# Patient Record
Sex: FEMALE | Race: WHITE | NOT HISPANIC OR LATINO | URBAN - METROPOLITAN AREA
[De-identification: names, ages, dates, MRNs, and addresses within clinical notes are randomized per-mention and may not be internally consistent; named-entity substitution may affect disease eponyms.]

---

## 2017-07-12 ENCOUNTER — EMERGENCY (EMERGENCY)
Facility: HOSPITAL | Age: 27
LOS: 1 days | Discharge: HOME | End: 2017-07-12
Attending: EMERGENCY MEDICINE
Payer: MEDICAID

## 2017-07-12 VITALS
DIASTOLIC BLOOD PRESSURE: 112 MMHG | TEMPERATURE: 99 F | SYSTOLIC BLOOD PRESSURE: 143 MMHG | HEIGHT: 68 IN | WEIGHT: 220.02 LBS | RESPIRATION RATE: 18 BRPM | OXYGEN SATURATION: 98 % | HEART RATE: 109 BPM

## 2017-07-12 VITALS
DIASTOLIC BLOOD PRESSURE: 86 MMHG | SYSTOLIC BLOOD PRESSURE: 130 MMHG | TEMPERATURE: 98 F | RESPIRATION RATE: 18 BRPM | HEART RATE: 71 BPM | OXYGEN SATURATION: 100 %

## 2017-07-12 DIAGNOSIS — Z88.0 ALLERGY STATUS TO PENICILLIN: ICD-10-CM

## 2017-07-12 DIAGNOSIS — N10 ACUTE PYELONEPHRITIS: ICD-10-CM

## 2017-07-12 DIAGNOSIS — N20.0 CALCULUS OF KIDNEY: ICD-10-CM

## 2017-07-12 LAB
ALBUMIN SERPL ELPH-MCNC: 3.7 G/DL — SIGNIFICANT CHANGE UP (ref 3.5–5)
ALP SERPL-CCNC: 83 U/L — SIGNIFICANT CHANGE UP (ref 40–120)
ALT FLD-CCNC: 22 U/L DA — SIGNIFICANT CHANGE UP (ref 10–60)
ANION GAP SERPL CALC-SCNC: 8 MMOL/L — SIGNIFICANT CHANGE UP (ref 5–17)
APPEARANCE UR: ABNORMAL
AST SERPL-CCNC: 16 U/L — SIGNIFICANT CHANGE UP (ref 10–40)
BASOPHILS # BLD AUTO: 0.1 K/UL — SIGNIFICANT CHANGE UP (ref 0–0.2)
BASOPHILS NFR BLD AUTO: 1.5 % — SIGNIFICANT CHANGE UP (ref 0–2)
BILIRUB SERPL-MCNC: 0.4 MG/DL — SIGNIFICANT CHANGE UP (ref 0.2–1.2)
BILIRUB UR-MCNC: NEGATIVE — SIGNIFICANT CHANGE UP
BUN SERPL-MCNC: 15 MG/DL — SIGNIFICANT CHANGE UP (ref 7–18)
CALCIUM SERPL-MCNC: 8.7 MG/DL — SIGNIFICANT CHANGE UP (ref 8.4–10.5)
CHLORIDE SERPL-SCNC: 110 MMOL/L — HIGH (ref 96–108)
CO2 SERPL-SCNC: 22 MMOL/L — SIGNIFICANT CHANGE UP (ref 22–31)
COLOR SPEC: YELLOW — SIGNIFICANT CHANGE UP
CREAT SERPL-MCNC: 0.9 MG/DL — SIGNIFICANT CHANGE UP (ref 0.5–1.3)
DIFF PNL FLD: ABNORMAL
EOSINOPHIL # BLD AUTO: 0.2 K/UL — SIGNIFICANT CHANGE UP (ref 0–0.5)
EOSINOPHIL NFR BLD AUTO: 2.2 % — SIGNIFICANT CHANGE UP (ref 0–6)
GLUCOSE SERPL-MCNC: 82 MG/DL — SIGNIFICANT CHANGE UP (ref 70–99)
GLUCOSE UR QL: NEGATIVE — SIGNIFICANT CHANGE UP
HCG SERPL-ACNC: <1 MIU/ML — SIGNIFICANT CHANGE UP
HCT VFR BLD CALC: 39.4 % — SIGNIFICANT CHANGE UP (ref 34.5–45)
HGB BLD-MCNC: 13 G/DL — SIGNIFICANT CHANGE UP (ref 11.5–15.5)
KETONES UR-MCNC: NEGATIVE — SIGNIFICANT CHANGE UP
LEUKOCYTE ESTERASE UR-ACNC: ABNORMAL
LIDOCAIN IGE QN: 205 U/L — SIGNIFICANT CHANGE UP (ref 73–393)
LYMPHOCYTES # BLD AUTO: 2 K/UL — SIGNIFICANT CHANGE UP (ref 1–3.3)
LYMPHOCYTES # BLD AUTO: 23 % — SIGNIFICANT CHANGE UP (ref 13–44)
MAGNESIUM SERPL-MCNC: 2.1 MG/DL — SIGNIFICANT CHANGE UP (ref 1.6–2.6)
MCHC RBC-ENTMCNC: 33 GM/DL — SIGNIFICANT CHANGE UP (ref 32–36)
MCHC RBC-ENTMCNC: 33.2 PG — SIGNIFICANT CHANGE UP (ref 27–34)
MCV RBC AUTO: 100.5 FL — HIGH (ref 80–100)
MONOCYTES # BLD AUTO: 0.5 K/UL — SIGNIFICANT CHANGE UP (ref 0–0.9)
MONOCYTES NFR BLD AUTO: 5.5 % — SIGNIFICANT CHANGE UP (ref 2–14)
NEUTROPHILS # BLD AUTO: 5.8 K/UL — SIGNIFICANT CHANGE UP (ref 1.8–7.4)
NEUTROPHILS NFR BLD AUTO: 67.8 % — SIGNIFICANT CHANGE UP (ref 43–77)
NITRITE UR-MCNC: NEGATIVE — SIGNIFICANT CHANGE UP
PH UR: 6 — SIGNIFICANT CHANGE UP (ref 5–8)
PLATELET # BLD AUTO: 244 K/UL — SIGNIFICANT CHANGE UP (ref 150–400)
POTASSIUM SERPL-MCNC: 4.1 MMOL/L — SIGNIFICANT CHANGE UP (ref 3.5–5.3)
POTASSIUM SERPL-SCNC: 4.1 MMOL/L — SIGNIFICANT CHANGE UP (ref 3.5–5.3)
PROT SERPL-MCNC: 7.5 G/DL — SIGNIFICANT CHANGE UP (ref 6–8.3)
PROT UR-MCNC: 100
RBC # BLD: 3.92 M/UL — SIGNIFICANT CHANGE UP (ref 3.8–5.2)
RBC # FLD: 13.2 % — SIGNIFICANT CHANGE UP (ref 10.3–14.5)
SODIUM SERPL-SCNC: 140 MMOL/L — SIGNIFICANT CHANGE UP (ref 135–145)
SP GR SPEC: 1.01 — SIGNIFICANT CHANGE UP (ref 1.01–1.02)
UROBILINOGEN FLD QL: NEGATIVE — SIGNIFICANT CHANGE UP
WBC # BLD: 8.6 K/UL — SIGNIFICANT CHANGE UP (ref 3.8–10.5)
WBC # FLD AUTO: 8.6 K/UL — SIGNIFICANT CHANGE UP (ref 3.8–10.5)

## 2017-07-12 PROCEDURE — 99284 EMERGENCY DEPT VISIT MOD MDM: CPT | Mod: 25

## 2017-07-12 PROCEDURE — 96374 THER/PROPH/DIAG INJ IV PUSH: CPT

## 2017-07-12 PROCEDURE — 76830 TRANSVAGINAL US NON-OB: CPT | Mod: 26

## 2017-07-12 PROCEDURE — 81001 URINALYSIS AUTO W/SCOPE: CPT

## 2017-07-12 PROCEDURE — 85027 COMPLETE CBC AUTOMATED: CPT

## 2017-07-12 PROCEDURE — 83735 ASSAY OF MAGNESIUM: CPT

## 2017-07-12 PROCEDURE — 99285 EMERGENCY DEPT VISIT HI MDM: CPT

## 2017-07-12 PROCEDURE — 87086 URINE CULTURE/COLONY COUNT: CPT

## 2017-07-12 PROCEDURE — 74176 CT ABD & PELVIS W/O CONTRAST: CPT

## 2017-07-12 PROCEDURE — 76856 US EXAM PELVIC COMPLETE: CPT | Mod: 26

## 2017-07-12 PROCEDURE — 76830 TRANSVAGINAL US NON-OB: CPT

## 2017-07-12 PROCEDURE — 83690 ASSAY OF LIPASE: CPT

## 2017-07-12 PROCEDURE — 80053 COMPREHEN METABOLIC PANEL: CPT

## 2017-07-12 PROCEDURE — 84702 CHORIONIC GONADOTROPIN TEST: CPT

## 2017-07-12 PROCEDURE — 76856 US EXAM PELVIC COMPLETE: CPT

## 2017-07-12 PROCEDURE — 74176 CT ABD & PELVIS W/O CONTRAST: CPT | Mod: 26

## 2017-07-12 PROCEDURE — 96375 TX/PRO/DX INJ NEW DRUG ADDON: CPT

## 2017-07-12 RX ORDER — CEFTRIAXONE 500 MG/1
1 INJECTION, POWDER, FOR SOLUTION INTRAMUSCULAR; INTRAVENOUS ONCE
Qty: 0 | Refills: 0 | Status: COMPLETED | OUTPATIENT
Start: 2017-07-12 | End: 2017-07-12

## 2017-07-12 RX ORDER — CEFPODOXIME PROXETIL 100 MG
2 TABLET ORAL
Qty: 56 | Refills: 0 | OUTPATIENT
Start: 2017-07-12 | End: 2017-07-26

## 2017-07-12 RX ORDER — SODIUM CHLORIDE 9 MG/ML
1000 INJECTION INTRAMUSCULAR; INTRAVENOUS; SUBCUTANEOUS ONCE
Qty: 0 | Refills: 0 | Status: COMPLETED | OUTPATIENT
Start: 2017-07-12 | End: 2017-07-12

## 2017-07-12 RX ORDER — ONDANSETRON 8 MG/1
4 TABLET, FILM COATED ORAL ONCE
Qty: 0 | Refills: 0 | Status: COMPLETED | OUTPATIENT
Start: 2017-07-12 | End: 2017-07-12

## 2017-07-12 RX ORDER — MORPHINE SULFATE 50 MG/1
4 CAPSULE, EXTENDED RELEASE ORAL ONCE
Qty: 0 | Refills: 0 | Status: DISCONTINUED | OUTPATIENT
Start: 2017-07-12 | End: 2017-07-12

## 2017-07-12 RX ADMIN — ONDANSETRON 4 MILLIGRAM(S): 8 TABLET, FILM COATED ORAL at 11:30

## 2017-07-12 RX ADMIN — SODIUM CHLORIDE 1000 MILLILITER(S): 9 INJECTION INTRAMUSCULAR; INTRAVENOUS; SUBCUTANEOUS at 11:31

## 2017-07-12 RX ADMIN — CEFTRIAXONE 100 GRAM(S): 500 INJECTION, POWDER, FOR SOLUTION INTRAMUSCULAR; INTRAVENOUS at 11:35

## 2017-07-12 RX ADMIN — MORPHINE SULFATE 4 MILLIGRAM(S): 50 CAPSULE, EXTENDED RELEASE ORAL at 11:30

## 2017-07-12 RX ADMIN — MORPHINE SULFATE 4 MILLIGRAM(S): 50 CAPSULE, EXTENDED RELEASE ORAL at 11:36

## 2017-07-12 NOTE — ED PROVIDER NOTE - PHYSICAL EXAMINATION
Gen: Alert, NAD  Head: NC, AT   Eyes: PERRL, EOMI, normal lids/conjunctiva  ENT: normal hearing, patent oropharynx without erythema/exudate, uvula midline  Neck: supple, no tenderness, Trachea midline  Pulm: Bilateral BS, normal resp effort, no wheeze/stridor/retractions  CV: RRR, no M/R/G, 2+ radial and dp pulses bl, no edema  Abd: soft, minimal left lower abd ttp. +BS, no hepatosplenomegaly  Mskel: extremities x4 with normal ROM and no joint effusions. no ctl spine ttp.   Skin: no rash, no bruising   Neuro: AAOx3, no sensory/motor deficits, CN 2-12 intact Gen: Alert, NAD  Head: NC, AT   Eyes: PERRL, EOMI, normal lids/conjunctiva  ENT: normal hearing, patent oropharynx without erythema/exudate, uvula midline  Neck: supple, no tenderness, Trachea midline  Pulm: Bilateral BS, normal resp effort, no wheeze/stridor/retractions  CV: RRR, no M/R/G, 2+ radial and dp pulses bl, no edema  Abd: soft, minimal left lower abd ttp. +BS, no hepatosplenomegaly  Mskel: extremities x4 with normal ROM and no joint effusions. no ctl spine ttp.   Skin: no rash, no bruising   Neuro: AAOx3, no sensory/motor deficits, CN 2-12 intact  : no cervical tenderness, physiologic dc in vagina. normal external vulva

## 2017-07-12 NOTE — ED PROVIDER NOTE - OBJECTIVE STATEMENT
Pertinent PMH/PSH/FHx/SHx and Review of Systems contained within:  26F hx of previous kidney infections, completed abx 1.5 weeks ago, pw left sided lower abd pain x1 week. associated with subjective fevers, nausea but no vomiting. patient reports moving bowels normally, no vaginal dc, no dysuria. had ct scan several weeks ago, but does not know the results  Fh and Sh not otherwise contributory  ROS otherwise negative

## 2017-07-12 NOTE — ED PROVIDER NOTE - MEDICAL DECISION MAKING DETAILS
patient pw lower abd pain. evaluate for torsion vs uti vs sti patient pw lower abd pain. evaluate for torsion vs uti vs sti. Patient has UTI with pyelo and also with staghorn calculus. Will need abx but also urologic follow up. given she is not septic and tolerating po, she can be treated as outpatient.

## 2017-07-13 LAB
C TRACH RRNA SPEC QL NAA+PROBE: SIGNIFICANT CHANGE UP
CULTURE RESULTS: SIGNIFICANT CHANGE UP
N GONORRHOEA RRNA SPEC QL NAA+PROBE: SIGNIFICANT CHANGE UP
SPECIMEN SOURCE: SIGNIFICANT CHANGE UP
SPECIMEN SOURCE: SIGNIFICANT CHANGE UP

## 2017-07-27 ENCOUNTER — OUTPATIENT (OUTPATIENT)
Dept: OUTPATIENT SERVICES | Facility: HOSPITAL | Age: 27
LOS: 1 days | End: 2017-07-27
Payer: MEDICAID

## 2017-07-27 VITALS
RESPIRATION RATE: 19 BRPM | OXYGEN SATURATION: 97 % | HEART RATE: 93 BPM | WEIGHT: 225.09 LBS | TEMPERATURE: 98 F | DIASTOLIC BLOOD PRESSURE: 95 MMHG | SYSTOLIC BLOOD PRESSURE: 141 MMHG | HEIGHT: 68.5 IN

## 2017-07-27 DIAGNOSIS — N20.0 CALCULUS OF KIDNEY: ICD-10-CM

## 2017-07-27 DIAGNOSIS — Z98.890 OTHER SPECIFIED POSTPROCEDURAL STATES: Chronic | ICD-10-CM

## 2017-07-27 DIAGNOSIS — Z96.641 PRESENCE OF RIGHT ARTIFICIAL HIP JOINT: Chronic | ICD-10-CM

## 2017-07-27 DIAGNOSIS — Z01.818 ENCOUNTER FOR OTHER PREPROCEDURAL EXAMINATION: ICD-10-CM

## 2017-07-27 PROCEDURE — G0463: CPT

## 2017-07-27 RX ORDER — SODIUM CHLORIDE 9 MG/ML
3 INJECTION INTRAMUSCULAR; INTRAVENOUS; SUBCUTANEOUS EVERY 8 HOURS
Qty: 0 | Refills: 0 | Status: DISCONTINUED | OUTPATIENT
Start: 2017-08-01 | End: 2017-08-03

## 2017-07-27 NOTE — H&P PST ADULT - HISTORY OF PRESENT ILLNESS
26year old female with pmhx of hypertension, dislocated right hip, kidney stone, osteoarthritis and UTI presents today for presurgical testing for schedule for left percutaneous nephrolithotomy on 7/31/17

## 2017-07-27 NOTE — H&P PST ADULT - PMH
Dislocated hip  right hip  Hypertension    Kidney stones    Osteoarthritis    UTI (urinary tract infection)

## 2017-07-27 NOTE — H&P PST ADULT - PSH
History of hip replacement, total, right    History of pelvic surgery  Pelvic osteotomy - November 1999

## 2017-07-31 ENCOUNTER — RESULT REVIEW (OUTPATIENT)
Age: 27
End: 2017-07-31

## 2017-07-31 ENCOUNTER — INPATIENT (INPATIENT)
Facility: HOSPITAL | Age: 27
LOS: 2 days | Discharge: HOME | DRG: 659 | End: 2017-08-03
Attending: UROLOGY | Admitting: UROLOGY
Payer: MEDICAID

## 2017-07-31 VITALS
DIASTOLIC BLOOD PRESSURE: 87 MMHG | OXYGEN SATURATION: 98 % | RESPIRATION RATE: 19 BRPM | HEIGHT: 68.5 IN | SYSTOLIC BLOOD PRESSURE: 134 MMHG | TEMPERATURE: 98 F | HEART RATE: 84 BPM | WEIGHT: 225.09 LBS

## 2017-07-31 DIAGNOSIS — Z96.641 PRESENCE OF RIGHT ARTIFICIAL HIP JOINT: Chronic | ICD-10-CM

## 2017-07-31 DIAGNOSIS — N20.0 CALCULUS OF KIDNEY: ICD-10-CM

## 2017-07-31 DIAGNOSIS — Z98.890 OTHER SPECIFIED POSTPROCEDURAL STATES: Chronic | ICD-10-CM

## 2017-07-31 DIAGNOSIS — Z93.6 OTHER ARTIFICIAL OPENINGS OF URINARY TRACT STATUS: ICD-10-CM

## 2017-07-31 DIAGNOSIS — I10 ESSENTIAL (PRIMARY) HYPERTENSION: ICD-10-CM

## 2017-07-31 LAB
ABO RH CONFIRMATION: SIGNIFICANT CHANGE UP
ANION GAP SERPL CALC-SCNC: 10 MMOL/L — SIGNIFICANT CHANGE UP (ref 5–17)
BASOPHILS # BLD AUTO: 0.1 K/UL — SIGNIFICANT CHANGE UP (ref 0–0.2)
BASOPHILS NFR BLD AUTO: 0.5 % — SIGNIFICANT CHANGE UP (ref 0–2)
BUN SERPL-MCNC: 8 MG/DL — SIGNIFICANT CHANGE UP (ref 7–18)
CALCIUM SERPL-MCNC: 7.4 MG/DL — LOW (ref 8.4–10.5)
CHLORIDE SERPL-SCNC: 108 MMOL/L — SIGNIFICANT CHANGE UP (ref 96–108)
CO2 SERPL-SCNC: 21 MMOL/L — LOW (ref 22–31)
CREAT SERPL-MCNC: 1.13 MG/DL — SIGNIFICANT CHANGE UP (ref 0.5–1.3)
EOSINOPHIL # BLD AUTO: 0 K/UL — SIGNIFICANT CHANGE UP (ref 0–0.5)
EOSINOPHIL NFR BLD AUTO: 0 % — SIGNIFICANT CHANGE UP (ref 0–6)
GLUCOSE SERPL-MCNC: 153 MG/DL — HIGH (ref 70–99)
HCG UR QL: NEGATIVE — SIGNIFICANT CHANGE UP
HCT VFR BLD CALC: 31.3 % — LOW (ref 34.5–45)
HCT VFR BLD CALC: 34 % — LOW (ref 34.5–45)
HGB BLD-MCNC: 10.6 G/DL — LOW (ref 11.5–15.5)
HGB BLD-MCNC: 10.9 G/DL — LOW (ref 11.5–15.5)
LYMPHOCYTES # BLD AUTO: 0.8 K/UL — LOW (ref 1–3.3)
LYMPHOCYTES # BLD AUTO: 4.7 % — LOW (ref 13–44)
MCHC RBC-ENTMCNC: 32 GM/DL — SIGNIFICANT CHANGE UP (ref 32–36)
MCHC RBC-ENTMCNC: 32.8 PG — SIGNIFICANT CHANGE UP (ref 27–34)
MCHC RBC-ENTMCNC: 33.7 PG — SIGNIFICANT CHANGE UP (ref 27–34)
MCHC RBC-ENTMCNC: 34 GM/DL — SIGNIFICANT CHANGE UP (ref 32–36)
MCV RBC AUTO: 102.6 FL — HIGH (ref 80–100)
MCV RBC AUTO: 99.2 FL — SIGNIFICANT CHANGE UP (ref 80–100)
MONOCYTES # BLD AUTO: 0.6 K/UL — SIGNIFICANT CHANGE UP (ref 0–0.9)
MONOCYTES NFR BLD AUTO: 3.7 % — SIGNIFICANT CHANGE UP (ref 2–14)
NEUTROPHILS # BLD AUTO: 14.9 K/UL — HIGH (ref 1.8–7.4)
NEUTROPHILS NFR BLD AUTO: 91.1 % — HIGH (ref 43–77)
PLATELET # BLD AUTO: 262 K/UL — SIGNIFICANT CHANGE UP (ref 150–400)
PLATELET # BLD AUTO: 269 K/UL — SIGNIFICANT CHANGE UP (ref 150–400)
POTASSIUM SERPL-MCNC: 4 MMOL/L — SIGNIFICANT CHANGE UP (ref 3.5–5.3)
POTASSIUM SERPL-SCNC: 4 MMOL/L — SIGNIFICANT CHANGE UP (ref 3.5–5.3)
RBC # BLD: 3.16 M/UL — LOW (ref 3.8–5.2)
RBC # BLD: 3.32 M/UL — LOW (ref 3.8–5.2)
RBC # FLD: 13 % — SIGNIFICANT CHANGE UP (ref 10.3–14.5)
RBC # FLD: 13 % — SIGNIFICANT CHANGE UP (ref 10.3–14.5)
SODIUM SERPL-SCNC: 139 MMOL/L — SIGNIFICANT CHANGE UP (ref 135–145)
WBC # BLD: 16.4 K/UL — HIGH (ref 3.8–10.5)
WBC # BLD: 17.6 K/UL — HIGH (ref 3.8–10.5)
WBC # FLD AUTO: 16.4 K/UL — HIGH (ref 3.8–10.5)
WBC # FLD AUTO: 17.6 K/UL — HIGH (ref 3.8–10.5)

## 2017-07-31 PROCEDURE — 50433 PLMT NEPHROURETERAL CATHETER: CPT | Mod: LT

## 2017-07-31 PROCEDURE — 50081 PERQ NL/PL LITHOTRP CPLX>2CM: CPT | Mod: AS,LT

## 2017-07-31 PROCEDURE — 88300 SURGICAL PATH GROSS: CPT | Mod: 26

## 2017-07-31 RX ORDER — SODIUM CHLORIDE 9 MG/ML
1000 INJECTION, SOLUTION INTRAVENOUS
Qty: 0 | Refills: 0 | Status: DISCONTINUED | OUTPATIENT
Start: 2017-07-31 | End: 2017-08-01

## 2017-07-31 RX ORDER — MELOXICAM 15 MG/1
1 TABLET ORAL
Qty: 0 | Refills: 0 | COMMUNITY

## 2017-07-31 RX ORDER — ACETAMINOPHEN 500 MG
650 TABLET ORAL EVERY 6 HOURS
Qty: 0 | Refills: 0 | Status: DISCONTINUED | OUTPATIENT
Start: 2017-07-31 | End: 2017-08-03

## 2017-07-31 RX ORDER — CIPROFLOXACIN LACTATE 400MG/40ML
400 VIAL (ML) INTRAVENOUS EVERY 12 HOURS
Qty: 0 | Refills: 0 | Status: DISCONTINUED | OUTPATIENT
Start: 2017-07-31 | End: 2017-08-03

## 2017-07-31 RX ORDER — HYDRALAZINE HCL 50 MG
5 TABLET ORAL ONCE
Qty: 0 | Refills: 0 | Status: COMPLETED | OUTPATIENT
Start: 2017-07-31 | End: 2017-07-31

## 2017-07-31 RX ORDER — ACETAMINOPHEN 500 MG
1000 TABLET ORAL ONCE
Qty: 0 | Refills: 0 | Status: COMPLETED | OUTPATIENT
Start: 2017-07-31 | End: 2017-07-31

## 2017-07-31 RX ORDER — HYDROMORPHONE HYDROCHLORIDE 2 MG/ML
0.5 INJECTION INTRAMUSCULAR; INTRAVENOUS; SUBCUTANEOUS
Qty: 0 | Refills: 0 | Status: DISCONTINUED | OUTPATIENT
Start: 2017-07-31 | End: 2017-08-01

## 2017-07-31 RX ORDER — MORPHINE SULFATE 50 MG/1
4 CAPSULE, EXTENDED RELEASE ORAL EVERY 4 HOURS
Qty: 0 | Refills: 0 | Status: DISCONTINUED | OUTPATIENT
Start: 2017-07-31 | End: 2017-08-01

## 2017-07-31 RX ORDER — SODIUM CHLORIDE 9 MG/ML
1000 INJECTION INTRAMUSCULAR; INTRAVENOUS; SUBCUTANEOUS
Qty: 0 | Refills: 0 | Status: DISCONTINUED | OUTPATIENT
Start: 2017-07-31 | End: 2017-08-01

## 2017-07-31 RX ORDER — ACETAMINOPHEN 500 MG
2 TABLET ORAL
Qty: 0 | Refills: 0 | COMMUNITY

## 2017-07-31 RX ORDER — METOPROLOL TARTRATE 50 MG
2.5 TABLET ORAL ONCE
Qty: 0 | Refills: 0 | Status: COMPLETED | OUTPATIENT
Start: 2017-07-31 | End: 2017-07-31

## 2017-07-31 RX ORDER — OXYCODONE AND ACETAMINOPHEN 5; 325 MG/1; MG/1
1 TABLET ORAL EVERY 4 HOURS
Qty: 0 | Refills: 0 | Status: DISCONTINUED | OUTPATIENT
Start: 2017-07-31 | End: 2017-08-03

## 2017-07-31 RX ORDER — ONDANSETRON 8 MG/1
4 TABLET, FILM COATED ORAL EVERY 6 HOURS
Qty: 0 | Refills: 0 | Status: DISCONTINUED | OUTPATIENT
Start: 2017-07-31 | End: 2017-08-03

## 2017-07-31 RX ORDER — ONDANSETRON 8 MG/1
4 TABLET, FILM COATED ORAL ONCE
Qty: 0 | Refills: 0 | Status: DISCONTINUED | OUTPATIENT
Start: 2017-07-31 | End: 2017-08-01

## 2017-07-31 RX ADMIN — MORPHINE SULFATE 4 MILLIGRAM(S): 50 CAPSULE, EXTENDED RELEASE ORAL at 20:48

## 2017-07-31 RX ADMIN — SODIUM CHLORIDE 140 MILLILITER(S): 9 INJECTION, SOLUTION INTRAVENOUS at 17:00

## 2017-07-31 RX ADMIN — Medication 2.5 MILLIGRAM(S): at 18:16

## 2017-07-31 RX ADMIN — OXYCODONE AND ACETAMINOPHEN 1 TABLET(S): 5; 325 TABLET ORAL at 22:22

## 2017-07-31 RX ADMIN — ONDANSETRON 4 MILLIGRAM(S): 8 TABLET, FILM COATED ORAL at 20:47

## 2017-07-31 RX ADMIN — HYDROMORPHONE HYDROCHLORIDE 0.5 MILLIGRAM(S): 2 INJECTION INTRAMUSCULAR; INTRAVENOUS; SUBCUTANEOUS at 17:13

## 2017-07-31 RX ADMIN — HYDROMORPHONE HYDROCHLORIDE 0.5 MILLIGRAM(S): 2 INJECTION INTRAMUSCULAR; INTRAVENOUS; SUBCUTANEOUS at 17:33

## 2017-07-31 RX ADMIN — Medication 1000 MILLIGRAM(S): at 17:33

## 2017-07-31 RX ADMIN — Medication 400 MILLIGRAM(S): at 17:29

## 2017-07-31 RX ADMIN — MORPHINE SULFATE 4 MILLIGRAM(S): 50 CAPSULE, EXTENDED RELEASE ORAL at 20:55

## 2017-07-31 RX ADMIN — SODIUM CHLORIDE 65 MILLILITER(S): 9 INJECTION, SOLUTION INTRAVENOUS at 17:10

## 2017-07-31 RX ADMIN — HYDROMORPHONE HYDROCHLORIDE 0.5 MILLIGRAM(S): 2 INJECTION INTRAMUSCULAR; INTRAVENOUS; SUBCUTANEOUS at 17:34

## 2017-07-31 RX ADMIN — Medication 5 MILLIGRAM(S): at 18:50

## 2017-07-31 RX ADMIN — OXYCODONE AND ACETAMINOPHEN 1 TABLET(S): 5; 325 TABLET ORAL at 22:00

## 2017-07-31 RX ADMIN — Medication 200 MILLIGRAM(S): at 22:02

## 2017-07-31 RX ADMIN — HYDROMORPHONE HYDROCHLORIDE 0.5 MILLIGRAM(S): 2 INJECTION INTRAMUSCULAR; INTRAVENOUS; SUBCUTANEOUS at 17:29

## 2017-07-31 NOTE — CONSULT NOTE ADULT - PROBLEM SELECTOR RECOMMENDATION 9
Pt developed elevated BP of 165/116 after procedure with mild improvement after hydralazine dose.   Pt has known hx of HTN, previously taking atenolol 25mg daily  She was taken out of BP medications due to controlled BP a couple of months ago  Recommend restarting atenolol 25mg daily  Due to post op status can give lopressor 2.5mg IV x 1 and reassess BP  Monitor BP  recommend f/u TSH, lipid profile and HbA1C

## 2017-07-31 NOTE — CONSULT NOTE ADULT - ASSESSMENT
25 y/o F PMH of HTN and left kidney nephrolithiasis, who presents with scheduled for left percutaneous nephrolithotomy on 7/31/17. Pt developed elevated BP of 165/116 after procedure with mild improvement after hydralazine dose. Pt has known hx of HTN and was previously taking atenolol 25mg daily, she reported was taken out of BP medications due to controlled BP a couple of months ago

## 2017-07-31 NOTE — CONSULT NOTE ADULT - SUBJECTIVE AND OBJECTIVE BOX
Patient is a 25 y/o F PMH of HTN and left kidney nephrolithiasis, who presents with scheduled for left percutaneous nephrolithotomy on 7/31/17.  (31 Jul 2017 09:17).     INTERVAL HPI / OVERNIGHT EVENTS:  25 y/o F PMH of HTN (since age 13 y/o, unknown etiology) and left kidney nephrolithiasis, who presents with scheduled for left percutaneous nephrolithotomy on 7/31/17. Pt developed elevated BP of 165/116 after procedure with mild improvement after hydralazine dose. Pt has known hx of HTN and was previously taking atenolol 25mg daily, she reported was taken out of BP medications due to controlled BP a couple of months ago.    Family Hx: Father / Mother: HTN     Allergy: Penicillin (rash)     T(C): 37 (07-31-17 @ 16:20), Max: 37 (07-31-17 @ 16:20)  HR: 116 (07-31-17 @ 17:05) (84 - 116)  BP: 155/105 (07-31-17 @ 17:05) (112/100 - 165/116)  RR: 21 (07-31-17 @ 17:05) (18 - 21)  SpO2: 100% (07-31-17 @ 17:05) (98% - 100%)    LABS:                        10.9   17.6  )-----------( 269      ( 31 Jul 2017 16:45 )             34.0     REVIEW OF SYSTEMS:  CONSTITUTIONAL: No fever, weight loss, or fatigue  EYES: No eye pain, visual disturbances, or discharge  ENMT:  No difficulty hearing, tinnitus, vertigo; No sinus or throat pain  NECK: No pain or stiffness  BREASTS: No pain, masses, or nipple discharge  RESPIRATORY: No cough, wheezing, chills or hemoptysis; No shortness of breath  CARDIOVASCULAR: No chest pain, palpitations, dizziness, or leg swelling  GASTROINTESTINAL: No abdominal or epigastric pain. No nausea, vomiting, or hematemesis; No diarrhea or constipation. No melena or hematochezia.  GENITOURINARY: mild pain over nephrostomy site   NEUROLOGICAL: No headaches, memory loss, loss of strength, numbness, or tremors  SKIN: No itching, burning, rashes, or lesions   MUSCULOSKELETAL: No joint pain or swelling; No muscle, back, or extremity pain      RADIOLOGY & ADDITIONAL TESTS:    CT abdomen 7/12: Mild splenomegaly. No evidence for a calculus in the ureters or right kidney. Staghorn calculus in the left renal pelvis. No evidence for hydronephrosis.In the right adnexal region, there is a 5.0 x 2.9 cm cystic structure with a thin internal septations, suggestive of a right ovarian cyst.    Imaging Personally Reviewed:  [x] YES  [ ] NO    PHYSICAL EXAM:  GENERAL: NAD, well-groomed, well-developed  HEAD:  Atraumatic, Normocephalic  EYES: EOMI, PERRLA, conjunctiva and sclera clear  ENMT: No tonsillar erythema, exudates, or enlargement; Moist mucous membranes, Good dentition, No lesions  NECK: Supple, No JVD, Normal thyroid  NERVOUS SYSTEM:  Alert & Oriented X3, Good concentration; Motor Strength 5/5 B/L upper and lower extremities; DTRs 2+ intact and symmetric  CHEST/LUNG: Clear to percussion bilaterally; No rales, rhonchi, wheezing, or rubs  HEART: Regular rate and rhythm; No murmurs, rubs, or gallops  ABDOMEN: Soft, mild tenderness on palpation, Left nephrostomy tube with hematuria, Bowel sounds present  EXTREMITIES:  2+ Peripheral Pulses, No clubbing, cyanosis, or edema  SKIN: No rashes or lesions    Care Discussed with Consultants/Other Providers [x] YES  [ ] NO

## 2017-07-31 NOTE — CHART NOTE - NSCHARTNOTEFT_GEN_A_CORE
pt s/p pcnl   postop has been hypertensive  examined and noted to have blood soaked dressing  4 cc added to nephrostomy balloon with good effect  dressing checked 1 hour later relatively dry   hr has been has been in the 90's bp remains elevated  urine punch colored    plan   monitor in icu   bp control   serial hct next 8 pm  transfuse as needed  if continues to bleed will need IR embolization

## 2017-08-01 ENCOUNTER — TRANSCRIPTION ENCOUNTER (OUTPATIENT)
Age: 27
End: 2017-08-01

## 2017-08-01 DIAGNOSIS — N20.0 CALCULUS OF KIDNEY: ICD-10-CM

## 2017-08-01 DIAGNOSIS — Z29.9 ENCOUNTER FOR PROPHYLACTIC MEASURES, UNSPECIFIED: ICD-10-CM

## 2017-08-01 DIAGNOSIS — Z98.890 OTHER SPECIFIED POSTPROCEDURAL STATES: ICD-10-CM

## 2017-08-01 DIAGNOSIS — R58 HEMORRHAGE, NOT ELSEWHERE CLASSIFIED: ICD-10-CM

## 2017-08-01 DIAGNOSIS — I10 ESSENTIAL (PRIMARY) HYPERTENSION: ICD-10-CM

## 2017-08-01 DIAGNOSIS — N39.0 URINARY TRACT INFECTION, SITE NOT SPECIFIED: ICD-10-CM

## 2017-08-01 DIAGNOSIS — G89.18 OTHER ACUTE POSTPROCEDURAL PAIN: ICD-10-CM

## 2017-08-01 LAB
ANION GAP SERPL CALC-SCNC: 7 MMOL/L — SIGNIFICANT CHANGE UP (ref 5–17)
ANION GAP SERPL CALC-SCNC: 8 MMOL/L — SIGNIFICANT CHANGE UP (ref 5–17)
ANION GAP SERPL CALC-SCNC: 8 MMOL/L — SIGNIFICANT CHANGE UP (ref 5–17)
BASOPHILS # BLD AUTO: 0.1 K/UL — SIGNIFICANT CHANGE UP (ref 0–0.2)
BASOPHILS NFR BLD AUTO: 0.5 % — SIGNIFICANT CHANGE UP (ref 0–2)
BUN SERPL-MCNC: 8 MG/DL — SIGNIFICANT CHANGE UP (ref 7–18)
CALCIUM SERPL-MCNC: 7.4 MG/DL — LOW (ref 8.4–10.5)
CALCIUM SERPL-MCNC: 7.5 MG/DL — LOW (ref 8.4–10.5)
CALCIUM SERPL-MCNC: 7.6 MG/DL — LOW (ref 8.4–10.5)
CHLORIDE SERPL-SCNC: 104 MMOL/L — SIGNIFICANT CHANGE UP (ref 96–108)
CHLORIDE SERPL-SCNC: 104 MMOL/L — SIGNIFICANT CHANGE UP (ref 96–108)
CHLORIDE SERPL-SCNC: 107 MMOL/L — SIGNIFICANT CHANGE UP (ref 96–108)
CO2 SERPL-SCNC: 23 MMOL/L — SIGNIFICANT CHANGE UP (ref 22–31)
CREAT SERPL-MCNC: 0.96 MG/DL — SIGNIFICANT CHANGE UP (ref 0.5–1.3)
CREAT SERPL-MCNC: 0.97 MG/DL — SIGNIFICANT CHANGE UP (ref 0.5–1.3)
CREAT SERPL-MCNC: 0.99 MG/DL — SIGNIFICANT CHANGE UP (ref 0.5–1.3)
EOSINOPHIL # BLD AUTO: 0 K/UL — SIGNIFICANT CHANGE UP (ref 0–0.5)
EOSINOPHIL NFR BLD AUTO: 0 % — SIGNIFICANT CHANGE UP (ref 0–6)
GLUCOSE SERPL-MCNC: 134 MG/DL — HIGH (ref 70–99)
GLUCOSE SERPL-MCNC: 137 MG/DL — HIGH (ref 70–99)
GLUCOSE SERPL-MCNC: 139 MG/DL — HIGH (ref 70–99)
HCT VFR BLD CALC: 29.4 % — LOW (ref 34.5–45)
HCT VFR BLD CALC: 29.5 % — LOW (ref 34.5–45)
HCT VFR BLD CALC: 30.2 % — LOW (ref 34.5–45)
HGB BLD-MCNC: 10 G/DL — LOW (ref 11.5–15.5)
HGB BLD-MCNC: 10.1 G/DL — LOW (ref 11.5–15.5)
HGB BLD-MCNC: 9.9 G/DL — LOW (ref 11.5–15.5)
INR BLD: 1.04 RATIO — SIGNIFICANT CHANGE UP (ref 0.88–1.16)
LYMPHOCYTES # BLD AUTO: 0.9 K/UL — LOW (ref 1–3.3)
LYMPHOCYTES # BLD AUTO: 6.1 % — LOW (ref 13–44)
MAGNESIUM SERPL-MCNC: 1.5 MG/DL — LOW (ref 1.6–2.6)
MAGNESIUM SERPL-MCNC: 2.4 MG/DL — SIGNIFICANT CHANGE UP (ref 1.6–2.6)
MCHC RBC-ENTMCNC: 33.4 PG — SIGNIFICANT CHANGE UP (ref 27–34)
MCHC RBC-ENTMCNC: 33.4 PG — SIGNIFICANT CHANGE UP (ref 27–34)
MCHC RBC-ENTMCNC: 33.5 GM/DL — SIGNIFICANT CHANGE UP (ref 32–36)
MCHC RBC-ENTMCNC: 33.6 GM/DL — SIGNIFICANT CHANGE UP (ref 32–36)
MCHC RBC-ENTMCNC: 33.6 PG — SIGNIFICANT CHANGE UP (ref 27–34)
MCHC RBC-ENTMCNC: 33.9 GM/DL — SIGNIFICANT CHANGE UP (ref 32–36)
MCV RBC AUTO: 99 FL — SIGNIFICANT CHANGE UP (ref 80–100)
MCV RBC AUTO: 99.3 FL — SIGNIFICANT CHANGE UP (ref 80–100)
MCV RBC AUTO: 99.8 FL — SIGNIFICANT CHANGE UP (ref 80–100)
MONOCYTES # BLD AUTO: 0.7 K/UL — SIGNIFICANT CHANGE UP (ref 0–0.9)
MONOCYTES NFR BLD AUTO: 5.4 % — SIGNIFICANT CHANGE UP (ref 2–14)
NEUTROPHILS # BLD AUTO: 12.3 K/UL — HIGH (ref 1.8–7.4)
NEUTROPHILS NFR BLD AUTO: 88 % — HIGH (ref 43–77)
PHOSPHATE SERPL-MCNC: 2.3 MG/DL — LOW (ref 2.5–4.5)
PHOSPHATE SERPL-MCNC: 3.4 MG/DL — SIGNIFICANT CHANGE UP (ref 2.5–4.5)
PLATELET # BLD AUTO: 236 K/UL — SIGNIFICANT CHANGE UP (ref 150–400)
PLATELET # BLD AUTO: 237 K/UL — SIGNIFICANT CHANGE UP (ref 150–400)
PLATELET # BLD AUTO: 254 K/UL — SIGNIFICANT CHANGE UP (ref 150–400)
POTASSIUM SERPL-MCNC: 3.9 MMOL/L — SIGNIFICANT CHANGE UP (ref 3.5–5.3)
POTASSIUM SERPL-MCNC: 4.1 MMOL/L — SIGNIFICANT CHANGE UP (ref 3.5–5.3)
POTASSIUM SERPL-MCNC: 4.2 MMOL/L — SIGNIFICANT CHANGE UP (ref 3.5–5.3)
POTASSIUM SERPL-SCNC: 3.9 MMOL/L — SIGNIFICANT CHANGE UP (ref 3.5–5.3)
POTASSIUM SERPL-SCNC: 4.1 MMOL/L — SIGNIFICANT CHANGE UP (ref 3.5–5.3)
POTASSIUM SERPL-SCNC: 4.2 MMOL/L — SIGNIFICANT CHANGE UP (ref 3.5–5.3)
PROTHROM AB SERPL-ACNC: 11.4 SEC — SIGNIFICANT CHANGE UP (ref 9.8–12.7)
RBC # BLD: 2.96 M/UL — LOW (ref 3.8–5.2)
RBC # BLD: 2.98 M/UL — LOW (ref 3.8–5.2)
RBC # BLD: 3.03 M/UL — LOW (ref 3.8–5.2)
RBC # FLD: 12.7 % — SIGNIFICANT CHANGE UP (ref 10.3–14.5)
RBC # FLD: 12.7 % — SIGNIFICANT CHANGE UP (ref 10.3–14.5)
RBC # FLD: 12.9 % — SIGNIFICANT CHANGE UP (ref 10.3–14.5)
SODIUM SERPL-SCNC: 134 MMOL/L — LOW (ref 135–145)
SODIUM SERPL-SCNC: 135 MMOL/L — SIGNIFICANT CHANGE UP (ref 135–145)
SODIUM SERPL-SCNC: 138 MMOL/L — SIGNIFICANT CHANGE UP (ref 135–145)
WBC # BLD: 14 K/UL — HIGH (ref 3.8–10.5)
WBC # BLD: 14.1 K/UL — HIGH (ref 3.8–10.5)
WBC # BLD: 14.2 K/UL — HIGH (ref 3.8–10.5)
WBC # FLD AUTO: 14 K/UL — HIGH (ref 3.8–10.5)
WBC # FLD AUTO: 14.1 K/UL — HIGH (ref 3.8–10.5)
WBC # FLD AUTO: 14.2 K/UL — HIGH (ref 3.8–10.5)

## 2017-08-01 PROCEDURE — 99233 SBSQ HOSP IP/OBS HIGH 50: CPT

## 2017-08-01 RX ORDER — METOPROLOL TARTRATE 50 MG
5 TABLET ORAL ONCE
Qty: 0 | Refills: 0 | Status: COMPLETED | OUTPATIENT
Start: 2017-08-01 | End: 2017-08-01

## 2017-08-01 RX ORDER — METOCLOPRAMIDE HCL 10 MG
10 TABLET ORAL ONCE
Qty: 0 | Refills: 0 | Status: DISCONTINUED | OUTPATIENT
Start: 2017-08-01 | End: 2017-08-01

## 2017-08-01 RX ORDER — KETOROLAC TROMETHAMINE 30 MG/ML
30 SYRINGE (ML) INJECTION EVERY 6 HOURS
Qty: 0 | Refills: 0 | Status: DISCONTINUED | OUTPATIENT
Start: 2017-08-01 | End: 2017-08-02

## 2017-08-01 RX ORDER — SODIUM CHLORIDE 9 MG/ML
1000 INJECTION, SOLUTION INTRAVENOUS
Qty: 0 | Refills: 0 | Status: DISCONTINUED | OUTPATIENT
Start: 2017-08-01 | End: 2017-08-03

## 2017-08-01 RX ORDER — PANTOPRAZOLE SODIUM 20 MG/1
40 TABLET, DELAYED RELEASE ORAL DAILY
Qty: 0 | Refills: 0 | Status: DISCONTINUED | OUTPATIENT
Start: 2017-08-01 | End: 2017-08-03

## 2017-08-01 RX ORDER — MAGNESIUM SULFATE 500 MG/ML
2 VIAL (ML) INJECTION ONCE
Qty: 0 | Refills: 0 | Status: COMPLETED | OUTPATIENT
Start: 2017-08-01 | End: 2017-08-01

## 2017-08-01 RX ORDER — HYDROMORPHONE HYDROCHLORIDE 2 MG/ML
0.5 INJECTION INTRAMUSCULAR; INTRAVENOUS; SUBCUTANEOUS EVERY 4 HOURS
Qty: 0 | Refills: 0 | Status: DISCONTINUED | OUTPATIENT
Start: 2017-08-01 | End: 2017-08-02

## 2017-08-01 RX ORDER — METOPROLOL TARTRATE 50 MG
5 TABLET ORAL EVERY 6 HOURS
Qty: 0 | Refills: 0 | Status: DISCONTINUED | OUTPATIENT
Start: 2017-08-01 | End: 2017-08-02

## 2017-08-01 RX ORDER — POTASSIUM PHOSPHATE, MONOBASIC POTASSIUM PHOSPHATE, DIBASIC 236; 224 MG/ML; MG/ML
15 INJECTION, SOLUTION INTRAVENOUS ONCE
Qty: 0 | Refills: 0 | Status: COMPLETED | OUTPATIENT
Start: 2017-08-01 | End: 2017-08-01

## 2017-08-01 RX ORDER — METOPROLOL TARTRATE 50 MG
25 TABLET ORAL THREE TIMES A DAY
Qty: 0 | Refills: 0 | Status: DISCONTINUED | OUTPATIENT
Start: 2017-08-01 | End: 2017-08-01

## 2017-08-01 RX ADMIN — HYDROMORPHONE HYDROCHLORIDE 0.5 MILLIGRAM(S): 2 INJECTION INTRAMUSCULAR; INTRAVENOUS; SUBCUTANEOUS at 06:40

## 2017-08-01 RX ADMIN — SODIUM CHLORIDE 100 MILLILITER(S): 9 INJECTION, SOLUTION INTRAVENOUS at 11:59

## 2017-08-01 RX ADMIN — HYDROMORPHONE HYDROCHLORIDE 0.5 MILLIGRAM(S): 2 INJECTION INTRAMUSCULAR; INTRAVENOUS; SUBCUTANEOUS at 06:00

## 2017-08-01 RX ADMIN — Medication 5 MILLIGRAM(S): at 08:16

## 2017-08-01 RX ADMIN — Medication 30 MILLIGRAM(S): at 23:30

## 2017-08-01 RX ADMIN — HYDROMORPHONE HYDROCHLORIDE 0.5 MILLIGRAM(S): 2 INJECTION INTRAMUSCULAR; INTRAVENOUS; SUBCUTANEOUS at 08:44

## 2017-08-01 RX ADMIN — HYDROMORPHONE HYDROCHLORIDE 0.5 MILLIGRAM(S): 2 INJECTION INTRAMUSCULAR; INTRAVENOUS; SUBCUTANEOUS at 09:26

## 2017-08-01 RX ADMIN — OXYCODONE AND ACETAMINOPHEN 1 TABLET(S): 5; 325 TABLET ORAL at 11:53

## 2017-08-01 RX ADMIN — HYDROMORPHONE HYDROCHLORIDE 0.5 MILLIGRAM(S): 2 INJECTION INTRAMUSCULAR; INTRAVENOUS; SUBCUTANEOUS at 13:00

## 2017-08-01 RX ADMIN — OXYCODONE AND ACETAMINOPHEN 1 TABLET(S): 5; 325 TABLET ORAL at 07:23

## 2017-08-01 RX ADMIN — SODIUM CHLORIDE 3 MILLILITER(S): 9 INJECTION INTRAMUSCULAR; INTRAVENOUS; SUBCUTANEOUS at 22:02

## 2017-08-01 RX ADMIN — ONDANSETRON 4 MILLIGRAM(S): 8 TABLET, FILM COATED ORAL at 20:46

## 2017-08-01 RX ADMIN — HYDROMORPHONE HYDROCHLORIDE 0.5 MILLIGRAM(S): 2 INJECTION INTRAMUSCULAR; INTRAVENOUS; SUBCUTANEOUS at 13:30

## 2017-08-01 RX ADMIN — Medication 30 MILLIGRAM(S): at 18:11

## 2017-08-01 RX ADMIN — HYDROMORPHONE HYDROCHLORIDE 0.5 MILLIGRAM(S): 2 INJECTION INTRAMUSCULAR; INTRAVENOUS; SUBCUTANEOUS at 21:01

## 2017-08-01 RX ADMIN — OXYCODONE AND ACETAMINOPHEN 1 TABLET(S): 5; 325 TABLET ORAL at 07:50

## 2017-08-01 RX ADMIN — POTASSIUM PHOSPHATE, MONOBASIC POTASSIUM PHOSPHATE, DIBASIC 62.5 MILLIMOLE(S): 236; 224 INJECTION, SOLUTION INTRAVENOUS at 01:28

## 2017-08-01 RX ADMIN — SODIUM CHLORIDE 3 MILLILITER(S): 9 INJECTION INTRAMUSCULAR; INTRAVENOUS; SUBCUTANEOUS at 12:29

## 2017-08-01 RX ADMIN — HYDROMORPHONE HYDROCHLORIDE 0.5 MILLIGRAM(S): 2 INJECTION INTRAMUSCULAR; INTRAVENOUS; SUBCUTANEOUS at 00:10

## 2017-08-01 RX ADMIN — OXYCODONE AND ACETAMINOPHEN 1 TABLET(S): 5; 325 TABLET ORAL at 13:30

## 2017-08-01 RX ADMIN — SODIUM CHLORIDE 100 MILLILITER(S): 9 INJECTION, SOLUTION INTRAVENOUS at 00:30

## 2017-08-01 RX ADMIN — MORPHINE SULFATE 4 MILLIGRAM(S): 50 CAPSULE, EXTENDED RELEASE ORAL at 03:39

## 2017-08-01 RX ADMIN — ONDANSETRON 4 MILLIGRAM(S): 8 TABLET, FILM COATED ORAL at 03:39

## 2017-08-01 RX ADMIN — PANTOPRAZOLE SODIUM 40 MILLIGRAM(S): 20 TABLET, DELAYED RELEASE ORAL at 11:59

## 2017-08-01 RX ADMIN — OXYCODONE AND ACETAMINOPHEN 1 TABLET(S): 5; 325 TABLET ORAL at 15:00

## 2017-08-01 RX ADMIN — Medication 1 PATCH: at 08:30

## 2017-08-01 RX ADMIN — MORPHINE SULFATE 4 MILLIGRAM(S): 50 CAPSULE, EXTENDED RELEASE ORAL at 04:00

## 2017-08-01 RX ADMIN — Medication 25 GRAM(S): at 01:29

## 2017-08-01 RX ADMIN — HYDROMORPHONE HYDROCHLORIDE 0.5 MILLIGRAM(S): 2 INJECTION INTRAMUSCULAR; INTRAVENOUS; SUBCUTANEOUS at 20:46

## 2017-08-01 RX ADMIN — HYDROMORPHONE HYDROCHLORIDE 0.5 MILLIGRAM(S): 2 INJECTION INTRAMUSCULAR; INTRAVENOUS; SUBCUTANEOUS at 01:31

## 2017-08-01 RX ADMIN — Medication 30 MILLIGRAM(S): at 17:30

## 2017-08-01 RX ADMIN — Medication 200 MILLIGRAM(S): at 05:53

## 2017-08-01 RX ADMIN — Medication 30 MILLIGRAM(S): at 23:45

## 2017-08-01 RX ADMIN — Medication 200 MILLIGRAM(S): at 17:16

## 2017-08-01 RX ADMIN — OXYCODONE AND ACETAMINOPHEN 1 TABLET(S): 5; 325 TABLET ORAL at 09:30

## 2017-08-01 RX ADMIN — Medication 25 MILLIGRAM(S): at 07:24

## 2017-08-01 NOTE — CONSULT NOTE ADULT - SUBJECTIVE AND OBJECTIVE BOX
Patient is a 26y old  Female who presents with a chief complaint of 'Pre surgical testing - kidney surgery" (31 Jul 2017 09:17)                25 y/o F PMH of HTN (since age 15 y/o, unknown etiology) and left kidney nephrolithiasis, who presents for left percutaneous nephron lithotomy on 7/31/17. Patient was taken to OR and L sided lithotomy was performed. After the procedure, patient started to bleed from nephrostomy site and ICU was consulted for post op monitoring. because of bleeding and dressing soak from the site of the nephrostomy tube . s/p 4 cc added to nephrostomy balloon with good effect ( tamponade ) .h/h prior to procedure 10.9/34 and after episode of bleeding 10.6/31.3    patient is currenlty feeling well reports pain at the site of the neprostomy tube     PAST MEDICAL & SURGICAL HISTORY:  Hypertension  UTI (urinary tract infection)  Dislocated hip: right hip  Osteoarthritis  Kidney stones  History of hip replacement, total, right  History of pelvic surgery: Pelvic osteotomy - November 1999    Allergies    latex (Hives; Rash)  penicillin (Hives)    Intolerances      FAMILY HISTORY:  No pertinent family history in first degree relatives    Social history reviewed: single lives with child LMP 15 July 2017 current active smoker 1/2 PPD for 6 years     Review of Systems:  no fever , chills , SOB , complains of right flank pain       Medications:  lactated ringers. 1000 milliLiter(s) IV Continuous <Continuous>  sodium chloride 0.9%. 1000 milliLiter(s) IV Continuous <Continuous>  HYDROmorphone  Injectable 0.5 milliGRAM(s) IV Push every 10 minutes PRN  ondansetron Injectable 4 milliGRAM(s) IV Push once PRN  dextrose 5% + sodium chloride 0.45%. 1000 milliLiter(s) IV Continuous <Continuous>  acetaminophen   Tablet 650 milliGRAM(s) Oral every 6 hours PRN  oxyCODONE    5 mG/acetaminophen 325 mG 1 Tablet(s) Oral every 4 hours PRN  morphine  - Injectable 4 milliGRAM(s) IV Push every 4 hours PRN  ondansetron Injectable 4 milliGRAM(s) IV Push every 6 hours PRN  ciprofloxacin   IVPB 400 milliGRAM(s) IV Intermittent every 12 hours            Vital Signs Last 24 Hrs  T(C): 37.3 (31 Jul 2017 23:33), Max: 37.3 (31 Jul 2017 23:33)  T(F): 99.1 (31 Jul 2017 23:33), Max: 99.1 (31 Jul 2017 23:33)  HR: 113 (31 Jul 2017 23:30) (75 - 116)  BP: 152/105 (31 Jul 2017 23:30) (112/100 - 165/116)  BP(mean): 115 (31 Jul 2017 23:30) (11 - 117)  RR: 19 (31 Jul 2017 23:30) (17 - 23)  SpO2: 100% (31 Jul 2017 23:30) (98% - 100%)                LABS:                        10.6   16.4  )-----------( 262      ( 31 Jul 2017 20:34 )             31.3     07-31    139  |  108  |  8   ----------------------------<  153<H>  4.0   |  21<L>  |  1.13    Ca    7.4<L>      31 Jul 2017 22:14            CAPILLARY BLOOD GLUCOSE            CULTURES:        Physical Examination:    General: No acute distress.      HEENT: Pupils equal, reactive to light.  Symmetric.    PULM: Clear to auscultation bilaterally,     CVS: Regular rate and rhythm, no murmurs, rubs, or gallops    ABD: tender left flank and LLQ bs postive nephrostomy tube in place dressing mildly soaked with blood connected to greenwood bag and containing blood tinged urine.    EXT: No edema, nontender    SKIN: Warm and well perfused, no rashes noted.    NEURO: Alert, oriented, interactive, nonfocal    RADIOLOGY REVIEWED ***    CRITICAL CARE TIME SPENT: 35 minutes

## 2017-08-01 NOTE — CONSULT NOTE ADULT - PROBLEM SELECTOR RECOMMENDATION 3
htn: bp was elevated post procedure  likely due to pain  patient was on atenolol at home  monitor bp c/w pain management  will start on atenolol 25 mg po

## 2017-08-01 NOTE — PROGRESS NOTE ADULT - SUBJECTIVE AND OBJECTIVE BOX
Chief Complaint:     HPI:   26y Female s/p left percutaneous nephrolithomy, POD #1.  Pt was admitted to icu due to hypertension.  Pt now in bed, NAD.  No nausea or vomiting.  No chest pain or sob. + complaints of abdominal pain which increases on exertion.       PAIN SCORE:   5/10      SCALE USED: (1-10 VNRS)    Allergies    latex (Hives; Rash)  penicillin (Hives)    Intolerances      MEDICATIONS  (STANDING):  sodium chloride 0.9% lock flush 3 milliLiter(s) IV Push every 8 hours  ciprofloxacin   IVPB 400 milliGRAM(s) IV Intermittent every 12 hours  dextrose 5% + sodium chloride 0.45%. 1000 milliLiter(s) (100 mL/Hr) IV Continuous <Continuous>  pantoprazole  Injectable 40 milliGRAM(s) IV Push daily    MEDICATIONS  (PRN):  acetaminophen   Tablet 650 milliGRAM(s) Oral every 6 hours PRN For Temp greater than 38.5 C (101.3 F)  oxyCODONE    5 mG/acetaminophen 325 mG 1 Tablet(s) Oral every 4 hours PRN Moderate Pain  ondansetron Injectable 4 milliGRAM(s) IV Push every 6 hours PRN Nausea  metoprolol Injectable 5 milliGRAM(s) IV Push every 6 hours PRN when SBP is >150  HYDROmorphone  Injectable 0.5 milliGRAM(s) IV Push every 4 hours PRN Moderate Pain (4 - 6)  ketorolac   Injectable 30 milliGRAM(s) IV Push every 6 hours PRN breakthrough pain      PHYSICAL EXAM:    Vital Signs Last 24 Hrs  T(C): 37.2 (01 Aug 2017 04:33), Max: 37.3 (31 Jul 2017 23:33)  T(F): 98.9 (01 Aug 2017 04:33), Max: 99.1 (31 Jul 2017 23:33)  HR: 74 (01 Aug 2017 11:00) (74 - 116)  BP: 134/87 (01 Aug 2017 11:00) (112/100 - 165/116)  BP(mean): 97 (01 Aug 2017 11:00) (11 - 118)  RR: 13 (01 Aug 2017 11:00) (12 - 23)  SpO2: 99% (01 Aug 2017 11:00) (99% - 100%)             LABS:                          9.9    14.0  )-----------( 237      ( 01 Aug 2017 06:42 )             29.4     08-01    135  |  104  |  8   ----------------------------<  134<H>  4.2   |  23  |  0.96    Ca    7.5<L>      01 Aug 2017 06:42  Phos  3.4     08-01  Mg     2.4     08-01      PT/INR - ( 01 Aug 2017 00:24 )   PT: 11.4 sec;   INR: 1.04 ratio               Drug Screen:        RADIOLOGY:

## 2017-08-01 NOTE — PROGRESS NOTE ADULT - SUBJECTIVE AND OBJECTIVE BOX
INTERVAL HPI/OVERNIGHT EVENTS: ***    PRESSORS: [ ] YES [ ] NO  WHICH:    ANTIBIOTICS:                  DATE STARTED:  ANTIBIOTICS:                  DATE STARTED:  ANTIBIOTICS:                  DATE STARTED:    Antimicrobial:  ciprofloxacin   IVPB 400 milliGRAM(s) IV Intermittent every 12 hours    Cardiovascular:    Pulmonary:    Hematalogic:    Other:  ondansetron Injectable 4 milliGRAM(s) IV Push once PRN  acetaminophen   Tablet 650 milliGRAM(s) Oral every 6 hours PRN  oxyCODONE    5 mG/acetaminophen 325 mG 1 Tablet(s) Oral every 4 hours PRN  morphine  - Injectable 4 milliGRAM(s) IV Push every 4 hours PRN  ondansetron Injectable 4 milliGRAM(s) IV Push every 6 hours PRN  dextrose 5% + sodium chloride 0.45%. 1000 milliLiter(s) IV Continuous <Continuous>    ondansetron Injectable 4 milliGRAM(s) IV Push once PRN  acetaminophen   Tablet 650 milliGRAM(s) Oral every 6 hours PRN  oxyCODONE    5 mG/acetaminophen 325 mG 1 Tablet(s) Oral every 4 hours PRN  morphine  - Injectable 4 milliGRAM(s) IV Push every 4 hours PRN  ondansetron Injectable 4 milliGRAM(s) IV Push every 6 hours PRN  ciprofloxacin   IVPB 400 milliGRAM(s) IV Intermittent every 12 hours  dextrose 5% + sodium chloride 0.45%. 1000 milliLiter(s) IV Continuous <Continuous>    Drug Dosing Weight  Height (cm): 173.99 (31 Jul 2017 09:37)  Weight (kg): 102.1 (31 Jul 2017 09:37)  BMI (kg/m2): 33.7 (31 Jul 2017 09:37)  BSA (m2): 2.16 (31 Jul 2017 09:37)    CENTRAL LINE: [ ] YES [ ] NO  LOCATION:   DATE INSERTED:  REMOVE: [ ] YES [ ] NO  EXPLAIN:    [place]: [ ] YES [ ] NO    DATE INSERTED:  REMOVE:  [ ] YES [ ] NO  EXPLAIN:    A-LINE:  [ ] YES [ ] NO  LOCATION:   DATE INSERTED:  REMOVE:  [ ] YES [ ] NO  EXPLAIN:    PMH -reviewed admission note, no change since admission  PAST MEDICAL & SURGICAL HISTORY:  Hypertension  UTI (urinary tract infection)  Dislocated hip: right hip  Osteoarthritis  Kidney stones  History of hip replacement, total, right  History of pelvic surgery: Pelvic osteotomy - November 1999      ICU Vital Signs Last 24 Hrs  T(C): 37.2 (01 Aug 2017 04:33), Max: 37.3 (31 Jul 2017 23:33)  T(F): 98.9 (01 Aug 2017 04:33), Max: 99.1 (31 Jul 2017 23:33)  HR: 116 (01 Aug 2017 06:00) (75 - 116)  BP: 157/102 (01 Aug 2017 06:00) (112/100 - 165/116)  BP(mean): 113 (01 Aug 2017 06:00) (11 - 118)  ABP: --  ABP(mean): --  RR: 18 (01 Aug 2017 06:00) (15 - 23)  SpO2: 100% (01 Aug 2017 06:00) (98% - 100%)                    PHYSICAL EXAM:    GENERAL: [ ]NAD, [ ]well-groomed, [ ]well-developed  HEAD:  [ ]Atraumatic, [ ]Normocephalic  EYES: [ ]EOMI, [ ]PERRLA, [ ]conjunctiva and sclera clear  ENMT: [ ]No tonsillar erythema, exudates, or enlargement; [ ]Moist mucous membranes, [ ]Good dentition, [ ]No lesions  NECK: [ ]Supple, normal appearance, [ ]No JVD; [ ]Normal thyroid; [ ]Trachea midline  NERVOUS SYSTEM:  [ ]Alert & Oriented X3, [ ]Good concentration; [ ]Motor Strength 5/5 B/L upper and lower extremities; [ ]DTRs 2+ intact and symmetric  CHEST/LUNG: [ ]No chest deformity; [ ]Normal percussion bilaterally; [ ]No rales, rhonchi, wheezing   HEART: [ ]Regular rate and rhythm; [ ]No murmurs, rubs, or gallops  ABDOMEN: [ ]Soft, Nontender, Nondistended; [ ]Bowel sounds present  EXTREMITIES:  [ ]2+ Peripheral Pulses, [ ]No clubbing, cyanosis, or edema  LYMPH: [ ]No lymphadenopathy noted  SKIN: [ ]No rashes or lesions; [ ]Good capillary refill      LABS:  CBC Full  -  ( 01 Aug 2017 06:42 )  WBC Count : 14.0 K/uL  Hemoglobin : 9.9 g/dL  Hematocrit : 29.4 %  Platelet Count - Automated : 237 K/uL  Mean Cell Volume : 99.3 fl  Mean Cell Hemoglobin : 33.4 pg  Mean Cell Hemoglobin Concentration : 33.6 gm/dL  Auto Neutrophil # : 12.3 K/uL  Auto Lymphocyte # : 0.9 K/uL  Auto Monocyte # : 0.7 K/uL  Auto Eosinophil # : 0.0 K/uL  Auto Basophil # : 0.1 K/uL  Auto Neutrophil % : 88.0 %  Auto Lymphocyte % : 6.1 %  Auto Monocyte % : 5.4 %  Auto Eosinophil % : 0.0 %  Auto Basophil % : 0.5 %    08-01    138  |  107  |  8   ----------------------------<  139<H>  3.9   |  23  |  0.97    Ca    7.4<L>      01 Aug 2017 00:24  Phos  2.3     08-01  Mg     1.5     08-01      PT/INR - ( 01 Aug 2017 00:24 )   PT: 11.4 sec;   INR: 1.04 ratio               RADIOLOGY & ADDITIONAL STUDIES REVIEWED:  ***    [ ]GOALS OF CARE DISCUSSION WITH PATIENT/FAMILY/PROXY:    Plan and Assessment:    25 y/o F PMH of HTN (since age 15 y/o, unknown etiology) and left kidney nephrolithiasis, who presents for left percutaneous nephron lithotomy on 7/31/17. Patient was taken to OR and L sided lithotomy was performed. After the procedure, patient started to bleed from nephrostomy site and ICU was consulted for post op monitoring and bleeding from the site of the nephrostomy tube .    1) Problem: Bleeding.   Recommendation: bleeding from site of Nephrostomy tube  : h/h stable  site examined . mild soaking of dressing   nephrostomy  attached to bag that is filled with light blood stained urine  h/h stable monitor cbc q8  f/u inr and type and screen  hemodynamically stable  ua shows >50 rbc and wbc count of 25-50   wbc count of 16 started on iv ciprofloxacin by urololgy   c/w iv fluids.    2)Problem: Nephrostomy status.   Recommendation: Nephrostomy status.  Recommendation: POD # 1   c/w pain management   management as per urology.    3)Problem: HTN (hypertension).   Recommendation: htn: bp was elevated post procedure  likely due to pain  patient was on atenolol at home  monitor bp c/w pain management  will start on atenolol 25 mg po.     4)Problem: Need for prophylactic measure.    Recommendation: no chemical a/c because of bleeding from nephrostomy site.           CRITICAL CARE TIME SPENT: 35 minutes INTERVAL HPI/OVERNIGHT EVENTS: Patient complaining of left abdominal pain and left flank pain, morphine given but making patient nauseous, and also vomiting, will switch to Dilaudid. Blood pressure not well controlled overnight. Clonidine patch ordered.    PRESSORS: [x ] YES [ ] NO  WHICH:    ANTIBIOTICS:                  DATE STARTED:  ANTIBIOTICS:                  DATE STARTED:  ANTIBIOTICS:                  DATE STARTED:    Antimicrobial:  ciprofloxacin   IVPB 400 milliGRAM(s) IV Intermittent every 12 hours    Cardiovascular:  Clonidine patch 0.2 mg/24hrs  Metoprolol 5 mg IV Push q6hrs PRN    Pulmonary:    Hematalogic:    Other:  ondansetron Injectable 4 milliGRAM(s) IV Push once PRN  acetaminophen   Tablet 650 milliGRAM(s) Oral every 6 hours PRN  oxyCODONE    5 mG/acetaminophen 325 mG 1 Tablet(s) Oral every 4 hours PRN  ondansetron Injectable 4 milliGRAM(s) IV Push every 6 hours PRN  dextrose 5% + sodium chloride 0.45%. 1000 milliLiter(s) IV Continuous       Drug Dosing Weight  Height (cm): 173.99 (31 Jul 2017 09:37)  Weight (kg): 102.1 (31 Jul 2017 09:37)  BMI (kg/m2): 33.7 (31 Jul 2017 09:37)  BSA (m2): 2.16 (31 Jul 2017 09:37)    CENTRAL LINE: [ ] YES [x ] NO  LOCATION:   DATE INSERTED:  REMOVE: [ ] YES [ ] NO  EXPLAIN:    [place]: [ x] YES [ ] NO    DATE INSERTED: July 31  REMOVE:  [ ] YES [ ] NO  EXPLAIN:    A-LINE:  [ ] YES [x ] NO  LOCATION:   DATE INSERTED:  REMOVE:  [ ] YES [ ] NO  EXPLAIN:    PMH -reviewed admission note, no change since admission  PAST MEDICAL & SURGICAL HISTORY:  Hypertension  UTI   Dislocated hip: right hip  Osteoarthritis  Kidney stones  History of hip replacement, total, right  History of pelvic surgery: Pelvic osteotomy - November 1999      ICU Vital Signs Last 24 Hrs  T(C): 37.2 (01 Aug 2017 04:33), Max: 37.3 (31 Jul 2017 23:33)  T(F): 98.9 (01 Aug 2017 04:33), Max: 99.1 (31 Jul 2017 23:33)  HR: 116 (01 Aug 2017 06:00) (75 - 116)  BP: 157/102 (01 Aug 2017 06:00) (112/100 - 165/116)  BP(mean): 113 (01 Aug 2017 06:00) (11 - 118)  ABP: --  ABP(mean): --  RR: 18 (01 Aug 2017 06:00) (15 - 23)  SpO2: 100% (01 Aug 2017 06:00) (98% - 100%)                    PHYSICAL EXAM:    GENERAL: [ ]NAD, [ ]well-groomed, [ ]well-developed  HEAD:  [ ]Atraumatic, [ ]Normocephalic  EYES: [ ]EOMI, [ ]PERRLA, [ ]conjunctiva and sclera clear  ENMT: [ ]No tonsillar erythema, exudates, or enlargement; [ ]Moist mucous membranes, [ ]Good dentition, [ ]No lesions  NECK: [ ]Supple, normal appearance, [ ]No JVD; [ ]Normal thyroid; [ ]Trachea midline  NERVOUS SYSTEM:  [ ]Alert & Oriented X3, [ ]Good concentration; [ ]Motor Strength 5/5 B/L upper and lower extremities; [ ]DTRs 2+ intact and symmetric  CHEST/LUNG: [ ]No chest deformity; [ ]Normal percussion bilaterally; [ ]No rales, rhonchi, wheezing   HEART: [ ]Regular rate and rhythm; [ ]No murmurs, rubs, or gallops  ABDOMEN: [ ]Soft, Nontender, Nondistended; [ ]Bowel sounds present  EXTREMITIES:  [ ]2+ Peripheral Pulses, [ ]No clubbing, cyanosis, or edema  LYMPH: [ ]No lymphadenopathy noted  SKIN: [ ]No rashes or lesions; [ ]Good capillary refill      LABS:  CBC Full  -  ( 01 Aug 2017 06:42 )  WBC Count : 14.0 K/uL  Hemoglobin : 9.9 g/dL  Hematocrit : 29.4 %  Platelet Count - Automated : 237 K/uL  Mean Cell Volume : 99.3 fl  Mean Cell Hemoglobin : 33.4 pg  Mean Cell Hemoglobin Concentration : 33.6 gm/dL  Auto Neutrophil # : 12.3 K/uL  Auto Lymphocyte # : 0.9 K/uL  Auto Monocyte # : 0.7 K/uL  Auto Eosinophil # : 0.0 K/uL  Auto Basophil # : 0.1 K/uL  Auto Neutrophil % : 88.0 %  Auto Lymphocyte % : 6.1 %  Auto Monocyte % : 5.4 %  Auto Eosinophil % : 0.0 %  Auto Basophil % : 0.5 %    08-01    138  |  107  |  8   ----------------------------<  139<H>  3.9   |  23  |  0.97    Ca    7.4<L>      01 Aug 2017 00:24  Phos  2.3     08-01  Mg     1.5     08-01      PT/INR - ( 01 Aug 2017 00:24 )   PT: 11.4 sec;   INR: 1.04 ratio               RADIOLOGY & ADDITIONAL STUDIES REVIEWED:  ***    [ ]GOALS OF CARE DISCUSSION WITH PATIENT/FAMILY/PROXY:    Plan and Assessment:    27 y/o F PMH of HTN (since age 13 y/o, unknown etiology) and left kidney nephrolithiasis, who presents for left percutaneous nephron lithotomy on 7/31/17. Patient was taken to OR and L sided lithotomy was performed. After the procedure, patient started to bleed from nephrostomy site and ICU was consulted for post op monitoring and bleeding from the site of the nephrostomy tube .    1) Problem: Bleeding.   Recommendation: bleeding from site of Nephrostomy tube  : h/h stable  site examined . mild soaking of dressing   nephrostomy  attached to bag that is filled with light blood stained urine  h/h stable monitor cbc q8  f/u inr and type and screen  hemodynamically stable  ua shows >50 rbc and wbc count of 25-50   wbc count of 16 started on iv ciprofloxacin by urololgy   c/w iv fluids.    2)Problem: Nephrostomy status.   Recommendation: Nephrostomy status.  Recommendation: POD # 1   c/w pain management   management as per urology.    3)Problem: HTN (hypertension).   Recommendation: htn: bp was elevated post procedure  likely due to pain  patient was on atenolol at home  monitor bp c/w pain management  will start on atenolol 25 mg po.     4)Problem: Need for prophylactic measure.    Recommendation: no chemical a/c because of bleeding from nephrostomy site.           CRITICAL CARE TIME SPENT: 35 minutes INTERVAL HPI/OVERNIGHT EVENTS: Patient complaining of left abdominal pain and left flank pain, morphine given but making patient nauseous, and also vomiting, will switch to Dilaudid. Blood pressure not well controlled overnight. Clonidine patch ordered.    PRESSORS: [x ] YES [ ] NO  WHICH:    ANTIBIOTICS: Ciprofloxacin IVPB 400 mg IV intermittent q12hr   DATE STARTED: 7/31  ANTIBIOTICS:                  DATE STARTED:  ANTIBIOTICS:                  DATE STARTED:    Antimicrobial:  ciprofloxacin   IVPB 400 milliGRAM(s) IV Intermittent every 12 hours    Cardiovascular:  Clonidine patch 0.2 mg/24hrs  Metoprolol 5 mg IV Push q6hrs PRN    Pulmonary:    Hematalogic:    Other:  ondansetron Injectable 4 milliGRAM(s) IV Push once PRN  acetaminophen   Tablet 650 milliGRAM(s) Oral every 6 hours PRN  oxyCODONE    5 mG/acetaminophen 325 mG 1 Tablet(s) Oral every 4 hours PRN  ondansetron Injectable 4 milliGRAM(s) IV Push every 6 hours PRN  dextrose 5% + sodium chloride 0.45%. 1000 milliLiter(s) IV Continuous       Drug Dosing Weight  Height (cm): 173.99 (31 Jul 2017 09:37)  Weight (kg): 102.1 (31 Jul 2017 09:37)  BMI (kg/m2): 33.7 (31 Jul 2017 09:37)  BSA (m2): 2.16 (31 Jul 2017 09:37)    CENTRAL LINE: [ ] YES [x ] NO  LOCATION:   DATE INSERTED:  REMOVE: [ ] YES [ ] NO  EXPLAIN:    [place]: [ x] YES [ ] NO    DATE INSERTED: July 31  REMOVE:  [ ] YES [ ] NO  EXPLAIN:    A-LINE:  [ ] YES [x ] NO  LOCATION:   DATE INSERTED:  REMOVE:  [ ] YES [ ] NO  EXPLAIN:    PMH -reviewed admission note, no change since admission  PAST MEDICAL & SURGICAL HISTORY:  Hypertension  UTI   Dislocated hip: right hip  Osteoarthritis  Kidney stones  History of hip replacement, total, right  History of pelvic surgery: Pelvic osteotomy - November 1999      ICU Vital Signs Last 24 Hrs  T(C): 37.2 (01 Aug 2017 04:33), Max: 37.3 (31 Jul 2017 23:33)  T(F): 98.9 (01 Aug 2017 04:33), Max: 99.1 (31 Jul 2017 23:33)  HR: 116 (01 Aug 2017 06:00) (75 - 116)  BP: 157/102 (01 Aug 2017 06:00) (112/100 - 165/116)  BP(mean): 113 (01 Aug 2017 06:00) (11 - 118)  ABP: --  ABP(mean): --  RR: 18 (01 Aug 2017 06:00) (15 - 23)  SpO2: 100% (01 Aug 2017 06:00) (98% - 100%)                    PHYSICAL EXAM:    GENERAL: [ ]NAD, [ x]well-groomed, [x ]well-developed  HEAD:  [ x]Atraumatic, [x ]Normocephalic  EYES: [ x]EOMI, [x ]PERRLA, [x ]conjunctiva and sclera clear  ENMT: [x ]No tonsillar erythema, exudates, or enlargement; [x ]Moist mucous membranes  NECK: [ x]Supple, normal appearance, [x ]No JVD; [ x]Normal thyroid; [x ]Trachea midline  NERVOUS SYSTEM:  [x ]Alert & Oriented X3, [x ]Good concentration; [ x]Motor Strength 5/5 B/L upper and lower extremities;   CHEST/LUNG: [x ]No chest deformity; [ x]Normal percussion bilaterally; [x ]No rales, rhonchi, wheezing   HEART: [ x]Regular rate and rhythm; [ x]No murmurs, rubs, or gallops  ABDOMEN: [x ]Left flank and LUQ Tenderness to palpation[x] Nondistended; [x ]Bowel sounds present. Nephrostomy tube left flank, draining  EXTREMITIES:  [x ]2+ Peripheral Pulses, [x ]No clubbing, cyanosis, or edema  LYMPH: [x ]No lymphadenopathy noted  SKIN: [ x]No rashes or lesions; [ x]Good capillary refill      LABS:  CBC Full  -  ( 01 Aug 2017 06:42 )  WBC Count : 14.0 K/uL  Hemoglobin : 9.9 g/dL  Hematocrit : 29.4 %  Platelet Count - Automated : 237 K/uL  Mean Cell Volume : 99.3 fl  Mean Cell Hemoglobin : 33.4 pg  Mean Cell Hemoglobin Concentration : 33.6 gm/dL  Auto Neutrophil # : 12.3 K/uL  Auto Lymphocyte # : 0.9 K/uL  Auto Monocyte # : 0.7 K/uL  Auto Eosinophil # : 0.0 K/uL  Auto Basophil # : 0.1 K/uL  Auto Neutrophil % : 88.0 %  Auto Lymphocyte % : 6.1 %  Auto Monocyte % : 5.4 %  Auto Eosinophil % : 0.0 %  Auto Basophil % : 0.5 %    08-01    138  |  107  |  8   ----------------------------<  139<H>  3.9   |  23  |  0.97    Ca    7.4<L>      01 Aug 2017 00:24  Phos  2.3     08-01  Mg     1.5     08-01      PT/INR - ( 01 Aug 2017 00:24 )   PT: 11.4 sec;   INR: 1.04 ratio               RADIOLOGY & ADDITIONAL STUDIES REVIEWED:  ***    [ ]GOALS OF CARE DISCUSSION WITH PATIENT/FAMILY/PROXY:    Plan and Assessment:    27 y/o F PMH of HTN (since age 15 y/o, unknown etiology) and left kidney nephrolithiasis, who presents for left percutaneous nephron lithotomy on 7/31/17. Patient was taken to OR and L sided lithotomy was performed. After the procedure, patient started to bleed from nephrostomy site and ICU was consulted for post op monitoring and bleeding from the site of the nephrostomy tube .    1) Problem: Bleeding.   Recommendation: bleeding from site of Nephrostomy tube  : h/h stable  site examined . mild soaking of dressing   nephrostomy  attached to bag that is filled with light blood stained urine  h/h stable monitor cbc q8  f/u inr and type and screen  hemodynamically stable  ua shows >50 rbc and wbc count of 25-50   wbc count of 16 started on iv ciprofloxacin by urololgy   c/w iv fluids.    2)Problem: Nephrostomy status.   Recommendation: Nephrostomy status.  Recommendation: POD # 1   c/w pain management   management as per urology.    3)Problem: HTN (hypertension).   Recommendation: htn: bp was elevated post procedure  likely due to pain  patient was on atenolol at home  monitor bp c/w pain management  will start on atenolol 25 mg po.     4)Problem: Need for prophylactic measure.    Recommendation: no chemical a/c because of bleeding from nephrostomy site.           CRITICAL CARE TIME SPENT: 35 minutes INTERVAL HPI/OVERNIGHT EVENTS: Patient complaining of left abdominal pain and left flank pain, morphine given but making patient nauseous, and also vomiting, will switch to Dilaudid. Blood pressure not well controlled overnight. Clonidine patch ordered.    PRESSORS: [x ] YES [ ] NO  WHICH:    ANTIBIOTICS: Ciprofloxacin IVPB 400 mg IV intermittent q12hr   DATE STARTED: 7/31  :    Antimicrobial:  ciprofloxacin   IVPB 400 milliGRAM(s) IV Intermittent every 12 hours    Cardiovascular:  Clonidine patch 0.2 mg/24hrs  Metoprolol 5 mg IV Push q6hrs PRN    Pulmonary:    Hematalogic:    Other:  ondansetron Injectable 4 milliGRAM(s) IV Push once PRN  acetaminophen   Tablet 650 milliGRAM(s) Oral every 6 hours PRN  oxyCODONE    5 mG/acetaminophen 325 mG 1 Tablet(s) Oral every 4 hours PRN  ondansetron Injectable 4 milliGRAM(s) IV Push every 6 hours PRN  dextrose 5% + sodium chloride 0.45%. 1000 milliLiter(s) IV Continuous       Drug Dosing Weight  Height (cm): 173.99 (31 Jul 2017 09:37)  Weight (kg): 102.1 (31 Jul 2017 09:37)  BMI (kg/m2): 33.7 (31 Jul 2017 09:37)  BSA (m2): 2.16 (31 Jul 2017 09:37)    CENTRAL LINE: [ ] YES [x ] NO  LOCATION:   DATE INSERTED:  REMOVE: [ ] YES [ ] NO  EXPLAIN:    [place]: [ x] YES [ ] NO    DATE INSERTED: July 31  REMOVE:  [ ] YES [ ] NO  EXPLAIN:    A-LINE:  [ ] YES [x ] NO  LOCATION:   DATE INSERTED:  REMOVE:  [ ] YES [ ] NO  EXPLAIN:    PMH -reviewed admission note, no change since admission  PAST MEDICAL & SURGICAL HISTORY:  Hypertension  UTI   Dislocated hip: right hip  Osteoarthritis  Kidney stones  History of hip replacement, total, right  History of pelvic surgery: Pelvic osteotomy - November 1999      ICU Vital Signs Last 24 Hrs  T(C): 37.2 (01 Aug 2017 04:33), Max: 37.3 (31 Jul 2017 23:33)  T(F): 98.9 (01 Aug 2017 04:33), Max: 99.1 (31 Jul 2017 23:33)  HR: 116 (01 Aug 2017 06:00) (75 - 116)  BP: 157/102 (01 Aug 2017 06:00) (112/100 - 165/116)  BP(mean): 113 (01 Aug 2017 06:00) (11 - 118)  ABP: --  ABP(mean): --  RR: 18 (01 Aug 2017 06:00) (15 - 23)  SpO2: 100% (01 Aug 2017 06:00) (98% - 100%)                    PHYSICAL EXAM:    GENERAL: [ ]NAD, [ x]well-groomed, [x ]well-developed  HEAD:  [ x]Atraumatic, [x ]Normocephalic  EYES: [ x]EOMI, [x ]PERRLA, [x ]conjunctiva and sclera clear  ENMT: [x ]No tonsillar erythema, exudates, or enlargement; [x ]Moist mucous membranes  NECK: [ x]Supple, normal appearance, [x ]No JVD; [ x]Normal thyroid; [x ]Trachea midline  NERVOUS SYSTEM:  [x ]Alert & Oriented X3, [x ]Good concentration; [ x]Motor Strength 5/5 B/L upper and lower extremities;   CHEST/LUNG: [x ]No chest deformity; [ x]Normal percussion bilaterally; [x ]No rales, rhonchi, wheezing   HEART: [ x]Regular rate and rhythm; [ x]No murmurs, rubs, or gallops  ABDOMEN: [x ]Left flank and LUQ Tenderness to palpation[x] Nondistended; [x ]Bowel sounds present. Nephrostomy tube left flank connected to bag, hematuria  EXTREMITIES:  [x ]2+ Peripheral Pulses, [x ]No clubbing, cyanosis, or edema  LYMPH: [x ]No lymphadenopathy noted  SKIN: [ x]No rashes or lesions; [ x]Good capillary refill      LABS:  CBC Full  -  ( 01 Aug 2017 06:42 )  WBC Count : 14.0 K/uL  Hemoglobin : 9.9 g/dL  Hematocrit : 29.4 %  Platelet Count - Automated : 237 K/uL  Mean Cell Volume : 99.3 fl  Mean Cell Hemoglobin : 33.4 pg  Mean Cell Hemoglobin Concentration : 33.6 gm/dL  Auto Neutrophil # : 12.3 K/uL  Auto Lymphocyte # : 0.9 K/uL  Auto Monocyte # : 0.7 K/uL  Auto Eosinophil # : 0.0 K/uL  Auto Basophil # : 0.1 K/uL  Auto Neutrophil % : 88.0 %  Auto Lymphocyte % : 6.1 %  Auto Monocyte % : 5.4 %  Auto Eosinophil % : 0.0 %  Auto Basophil % : 0.5 %    08-01    138  |  107  |  8   ----------------------------<  139<H>  3.9   |  23  |  0.97    Ca    7.4<L>      01 Aug 2017 00:24  Phos  2.3     08-01  Mg     1.5     08-01      PT/INR - ( 01 Aug 2017 00:24 )   PT: 11.4 sec;   INR: 1.04 ratio               RADIOLOGY & ADDITIONAL STUDIES REVIEWED:  ***    [ ]GOALS OF CARE DISCUSSION WITH PATIENT/FAMILY/PROXY:    Plan and Assessment:    27 y/o F PMH of HTN (since age 15 y/o, unknown etiology) and left kidney nephrolithiasis, who presents for left percutaneous nephrolithotomy on 7/31/17. Patient was taken to OR and L sided percutaneous nephrolithotomy was performed. After the procedure, patient started to bleed from nephrostomy site and ICU was consulted for post op monitoring and bleeding from the site of the nephrostomy tube .    1) Problem: Bleeding.   Recommendation: bleeding from site of Nephrostomy tube:  H&H stable  site examined: dressing soaked in blood, urologist requested change in dressing.   nephrostomy  attached to bag w/ hematuria  Monitor CBC q8hrs  Follow up INR and type and screen  Hemodynamically stable  UA shows >50 RBC and WBC count of 25-50   WBC count of 16 started on iv ciprofloxacin by urology   Continue w/ iv fluids.    2)Problem: Nephrostomy status.   Recommendation: Nephrostomy status.  Recommendation: POD # 1   c/w pain management: Dilaudid 1mg IV push q4hrs. Morphine was d/c due to nausea/vomiting probably due to morphine  management as per urology.    3)Problem: HTN (hypertension).   Recommendation: htn: bp was elevated post procedure  likely due to pain  patient was on atenolol at home  monitor bp c/w pain management  will start on atenolol 25 mg po.     4)Problem: Need for prophylactic measure.    Recommendation: no chemical a/c because of bleeding from nephrostomy site.           CRITICAL CARE TIME SPENT: 35 minutes INTERVAL HPI/OVERNIGHT EVENTS: Patient complaining of left abdominal pain and left flank pain, morphine given but making patient nauseous, and also vomiting, will switch to Dilaudid. Blood pressure not well controlled overnight. Clonidine patch ordered.    PRESSORS: [x ] YES [ ] NO  WHICH:    ANTIBIOTICS: Ciprofloxacin IVPB 400 mg IV intermittent q12hr   DATE STARTED: 7/31  :    Antimicrobial:  ciprofloxacin   IVPB 400 milliGRAM(s) IV Intermittent every 12 hours    Cardiovascular:  Clonidine patch 0.2 mg/24hrs  Metoprolol 5 mg IV Push q6hrs PRN    Pulmonary:    Hematologic:    Other:  ondansetron Injectable 4 milliGRAM(s) IV Push once PRN  acetaminophen   Tablet 650 milliGRAM(s) Oral every 6 hours PRN  oxyCODONE    5 mG/acetaminophen 325 mG 1 Tablet(s) Oral every 4 hours PRN  ondansetron Injectable 4 milliGRAM(s) IV Push every 6 hours PRN  dextrose 5% + sodium chloride 0.45%. 1000 milliLiter(s) IV Continuous   protonix 40 mg IV QD      Drug Dosing Weight  Height (cm): 173.99 (31 Jul 2017 09:37)  Weight (kg): 102.1 (31 Jul 2017 09:37)  BMI (kg/m2): 33.7 (31 Jul 2017 09:37)  BSA (m2): 2.16 (31 Jul 2017 09:37)    CENTRAL LINE: [ ] YES [x ] NO  LOCATION:   DATE INSERTED:  REMOVE: [ ] YES [ ] NO  EXPLAIN:    [place]: [ x] YES [ ] NO    DATE INSERTED: July 31  REMOVE:  [ ] YES [ ] NO  EXPLAIN:    A-LINE:  [ ] YES [x ] NO  LOCATION:   DATE INSERTED:  REMOVE:  [ ] YES [ ] NO  EXPLAIN:    PMH -reviewed admission note, no change since admission  PAST MEDICAL & SURGICAL HISTORY:  Hypertension  UTI   Dislocated hip: right hip  Osteoarthritis  Kidney stones  History of hip replacement, total, right  History of pelvic surgery: Pelvic osteotomy - November 1999      ICU Vital Signs Last 24 Hrs  T(C): 37.2 (01 Aug 2017 04:33), Max: 37.3 (31 Jul 2017 23:33)  T(F): 98.9 (01 Aug 2017 04:33), Max: 99.1 (31 Jul 2017 23:33)  HR: 116 (01 Aug 2017 06:00) (75 - 116)  BP: 157/102 (01 Aug 2017 06:00) (112/100 - 165/116)  BP(mean): 113 (01 Aug 2017 06:00) (11 - 118)  ABP: --  ABP(mean): --  RR: 18 (01 Aug 2017 06:00) (15 - 23)  SpO2: 100% (01 Aug 2017 06:00) (98% - 100%)                    PHYSICAL EXAM:    GENERAL: [ ]NAD, [ x]well-groomed, [x ]well-developed  HEAD:  [ x]Atraumatic, [x ]Normocephalic  EYES: [ x]EOMI, [x ]PERRLA, [x ]conjunctiva and sclera clear  ENMT: [x ]No tonsillar erythema, exudates, or enlargement; [x ]Moist mucous membranes  NECK: [ x]Supple, normal appearance, [x ]No JVD; [ x]Normal thyroid; [x ]Trachea midline  NERVOUS SYSTEM:  [x ]Alert & Oriented X3, [x ]Good concentration; [ x]Motor Strength 5/5 B/L upper and lower extremities;   CHEST/LUNG: [x ]No chest deformity; [ x]Normal percussion bilaterally; [x ]No rales, rhonchi, wheezing   HEART: [ x]Regular rate and rhythm; [ x]No murmurs, rubs, or gallops  ABDOMEN: [x ]Left flank and LUQ Tenderness to palpation[x] Nondistended; [x ]Bowel sounds present. Nephrostomy tube left flank connected to bag, hematuria  EXTREMITIES:  [x ]2+ Peripheral Pulses, [x ]No clubbing, cyanosis, or edema  LYMPH: [x ]No lymphadenopathy noted  SKIN: [ x]No rashes or lesions; [ x]Good capillary refill      LABS:  CBC Full  -  ( 01 Aug 2017 06:42 )  WBC Count : 14.0 K/uL  Hemoglobin : 9.9 g/dL  Hematocrit : 29.4 %  Platelet Count - Automated : 237 K/uL  Mean Cell Volume : 99.3 fl  Mean Cell Hemoglobin : 33.4 pg  Mean Cell Hemoglobin Concentration : 33.6 gm/dL  Auto Neutrophil # : 12.3 K/uL  Auto Lymphocyte # : 0.9 K/uL  Auto Monocyte # : 0.7 K/uL  Auto Eosinophil # : 0.0 K/uL  Auto Basophil # : 0.1 K/uL  Auto Neutrophil % : 88.0 %  Auto Lymphocyte % : 6.1 %  Auto Monocyte % : 5.4 %  Auto Eosinophil % : 0.0 %  Auto Basophil % : 0.5 %    08-01    138  |  107  |  8   ----------------------------<  139<H>  3.9   |  23  |  0.97    Ca    7.4<L>      01 Aug 2017 00:24  Phos  2.3     08-01  Mg     1.5     08-01      PT/INR - ( 01 Aug 2017 00:24 )   PT: 11.4 sec;   INR: 1.04 ratio               RADIOLOGY & ADDITIONAL STUDIES REVIEWED:  ***    [ ]GOALS OF CARE DISCUSSION WITH PATIENT/FAMILY/PROXY:    Plan and Assessment:    25 y/o F PMH of HTN (since age 13 y/o, unknown etiology) and left kidney nephrolithiasis, who presents for left percutaneous nephrolithotomy on 7/31/17. Patient was taken to OR and L sided percutaneous nephrolithotomy was performed. After the procedure, patient started to bleed from nephrostomy site and ICU was consulted for post op monitoring and bleeding from the site of the nephrostomy tube .    Patient will be downgraded to [facility].    1) Problem: Bleeding.   Recommendation: bleeding from site of Nephrostomy tube:  H&H stable  site examined: dressing soaked in blood, urologist requested change in dressing.   nephrostomy  attached to bag w/ hematuria  Monitor CBC q8hrs  Follow up INR and type and screen  Hemodynamically stable  UA shows >50 RBC and WBC count of 25-50   WBC count of 16 started on iv ciprofloxacin by urology   Continue w/ iv fluids.    2)Problem: Nephrostomy status.   Recommendation: Nephrostomy status.  Recommendation: POD # 1   c/w pain management: Dilaudid 1mg IV push q4hrs. Morphine was d/c due to nausea/vomiting after administration   Management as per urology.    3)Problem: HTN (hypertension).   Recommendation: htn: BP was elevated post procedure likely due to pain  patient was on atenolol at home  monitor bp c/w pain management  Patient placed on clonidine patch 0.2 mg/24hrs for better control BP.   Metoprolol 5mg IV push q6hrs    4)Problem: Need for prophylactic measure.    Recommendation: no chemical a/c because of bleeding from nephrostomy site.   Will place patient on Protonix 40 mg IV QD          CRITICAL CARE TIME SPENT: 35 minutes INTERVAL HPI/OVERNIGHT EVENTS: Patient complaining of left abdominal pain and left flank pain, morphine given but making patient nauseous, and also vomiting, will switch to Dilaudid. Blood pressure not well controlled overnight. Clonidine patch ordered.    PRESSORS: [x ] YES [ ] NO  WHICH:    ANTIBIOTICS: Ciprofloxacin IVPB 400 mg IV intermittent q12hr   DATE STARTED: 7/31  :    Antimicrobial:  ciprofloxacin   IVPB 400 milliGRAM(s) IV Intermittent every 12 hours    Cardiovascular:  Clonidine patch 0.2 mg/24hrs  Metoprolol 5 mg IV Push q6hrs PRN    Pulmonary:    Hematologic:    Other:  ondansetron Injectable 4 milliGRAM(s) IV Push once PRN  acetaminophen   Tablet 650 milliGRAM(s) Oral every 6 hours PRN  oxyCODONE    5 mG/acetaminophen 325 mG 1 Tablet(s) Oral every 4 hours PRN  ondansetron Injectable 4 milliGRAM(s) IV Push every 6 hours PRN  dextrose 5% + sodium chloride 0.45%. 1000 milliLiter(s) IV Continuous   protonix 40 mg IV QD      Drug Dosing Weight  Height (cm): 173.99 (31 Jul 2017 09:37)  Weight (kg): 102.1 (31 Jul 2017 09:37)  BMI (kg/m2): 33.7 (31 Jul 2017 09:37)  BSA (m2): 2.16 (31 Jul 2017 09:37)    CENTRAL LINE: [ ] YES [x ] NO  LOCATION:   DATE INSERTED:  REMOVE: [ ] YES [ ] NO  EXPLAIN:    [place]: [ x] YES [ ] NO    DATE INSERTED: July 31  REMOVE:  [ ] YES [ ] NO  EXPLAIN:    A-LINE:  [ ] YES [x ] NO  LOCATION:   DATE INSERTED:  REMOVE:  [ ] YES [ ] NO  EXPLAIN:    PMH -reviewed admission note, no change since admission  PAST MEDICAL & SURGICAL HISTORY:  Hypertension  UTI   Dislocated hip: right hip  Osteoarthritis  Kidney stones  History of hip replacement, total, right  History of pelvic surgery: Pelvic osteotomy - November 1999      ICU Vital Signs Last 24 Hrs  T(C): 37.2 (01 Aug 2017 04:33), Max: 37.3 (31 Jul 2017 23:33)  T(F): 98.9 (01 Aug 2017 04:33), Max: 99.1 (31 Jul 2017 23:33)  HR: 116 (01 Aug 2017 06:00) (75 - 116)  BP: 157/102 (01 Aug 2017 06:00) (112/100 - 165/116)  BP(mean): 113 (01 Aug 2017 06:00) (11 - 118)  ABP: --  ABP(mean): --  RR: 18 (01 Aug 2017 06:00) (15 - 23)  SpO2: 100% (01 Aug 2017 06:00) (98% - 100%)                    PHYSICAL EXAM:    GENERAL: [ ]NAD, [ x]well-groomed, [x ]well-developed  HEAD:  [ x]Atraumatic, [x ]Normocephalic  EYES: [ x]EOMI, [x ]PERRLA, [x ]conjunctiva and sclera clear  ENMT: [x ]No tonsillar erythema, exudates, or enlargement; [x ]Moist mucous membranes  NECK: [ x]Supple, normal appearance, [x ]No JVD; [ x]Normal thyroid; [x ]Trachea midline  NERVOUS SYSTEM:  [x ]Alert & Oriented X3, [x ]Good concentration; [ x]Motor Strength 5/5 B/L upper and lower extremities;   CHEST/LUNG: [x ]No chest deformity; [ x]Normal percussion bilaterally; [x ]No rales, rhonchi, wheezing   HEART: [ x]Regular rate and rhythm; [ x]No murmurs, rubs, or gallops  ABDOMEN: [x ]Left flank and LUQ Tenderness to palpation[x] Nondistended; [x ]Bowel sounds present. Nephrostomy tube left flank connected to bag, hematuria  EXTREMITIES:  [x ]2+ Peripheral Pulses, [x ]No clubbing, cyanosis, or edema  LYMPH: [x ]No lymphadenopathy noted  SKIN: [ x]No rashes or lesions; [ x]Good capillary refill      LABS:  CBC Full  -  ( 01 Aug 2017 06:42 )  WBC Count : 14.0 K/uL  Hemoglobin : 9.9 g/dL  Hematocrit : 29.4 %  Platelet Count - Automated : 237 K/uL  Mean Cell Volume : 99.3 fl  Mean Cell Hemoglobin : 33.4 pg  Mean Cell Hemoglobin Concentration : 33.6 gm/dL  Auto Neutrophil # : 12.3 K/uL  Auto Lymphocyte # : 0.9 K/uL  Auto Monocyte # : 0.7 K/uL  Auto Eosinophil # : 0.0 K/uL  Auto Basophil # : 0.1 K/uL  Auto Neutrophil % : 88.0 %  Auto Lymphocyte % : 6.1 %  Auto Monocyte % : 5.4 %  Auto Eosinophil % : 0.0 %  Auto Basophil % : 0.5 %    08-01    138  |  107  |  8   ----------------------------<  139<H>  3.9   |  23  |  0.97    Ca    7.4<L>      01 Aug 2017 00:24  Phos  2.3     08-01  Mg     1.5     08-01      PT/INR - ( 01 Aug 2017 00:24 )   PT: 11.4 sec;   INR: 1.04 ratio               RADIOLOGY & ADDITIONAL STUDIES REVIEWED:  ***    [ ]GOALS OF CARE DISCUSSION WITH PATIENT/FAMILY/PROXY:    Plan and Assessment:    25 y/o F PMH of HTN (since age 13 y/o, unknown etiology) and left kidney nephrolithiasis, who presents for left percutaneous nephrolithotomy on 7/31/17. Patient was taken to OR and L sided percutaneous nephrolithotomy was performed. After the procedure, patient started to bleed from nephrostomy site and ICU was consulted for post op monitoring and bleeding from the site of the nephrostomy tube .    Patient will be downgraded to [facility].    1) Problem: Bleeding.   Recommendation: bleeding from site of Nephrostomy tube:  H&H stable  site examined: dressing soaked in blood, urologist requested change in dressing.   nephrostomy  attached to bag w/ hematuria  Monitor CBC q8hrs  Follow up INR and type and screen  Hemodynamically stable  UA shows >50 RBC and WBC count of 25-50   WBC count of 16 started on iv ciprofloxacin by urology   Continue w/ iv fluids.    2)Problem: Nephrostomy status.   Recommendation: Nephrostomy status.  Recommendation: POD # 1   c/w pain management: Dilaudid 1mg IV push q4hrs. Morphine was d/c due to nausea/vomiting after administration   Management as per urology.    3)Problem: HTN (hypertension).   Recommendation: htn: BP was elevated post procedure likely due to pain  patient was on atenolol at home  monitor bp c/w pain management  Patient placed on clonidine patch 0.2 mg/24hrs for better control BP.   Metoprolol 5mg IV push q6hrs    4)Problem: Need for prophylactic measure.    Recommendation: no chemical a/c because of bleeding from nephrostomy site.   Will place patient on Protonix 40 mg IV QD          CRITICAL CARE TIME SPENT: 35 minutes INTERVAL HPI/OVERNIGHT EVENTS: Patient complaining of left abdominal pain and left flank pain, morphine given but making patient nauseous, and also vomiting, will switch to Dilaudid. Blood pressure not well controlled overnight. Clonidine patch ordered.    PRESSORS: [x ] YES [ ] NO  WHICH:    ANTIBIOTICS: Ciprofloxacin IVPB 400 mg IV intermittent q12hr   DATE STARTED: 7/31  :    Antimicrobial:  ciprofloxacin   IVPB 400 milliGRAM(s) IV Intermittent every 12 hours    Cardiovascular:  Clonidine patch 0.2 mg/24hrs  Metoprolol 5 mg IV Push q6hrs PRN    Pulmonary:    Hematologic:    Other:  ondansetron Injectable 4 milliGRAM(s) IV Push once PRN  acetaminophen   Tablet 650 milliGRAM(s) Oral every 6 hours PRN  oxyCODONE    5 mG/acetaminophen 325 mG 1 Tablet(s) Oral every 4 hours PRN  ondansetron Injectable 4 milliGRAM(s) IV Push every 6 hours PRN  dextrose 5% + sodium chloride 0.45%. 1000 milliLiter(s) IV Continuous   protonix 40 mg IV QD      Drug Dosing Weight  Height (cm): 173.99 (31 Jul 2017 09:37)  Weight (kg): 102.1 (31 Jul 2017 09:37)  BMI (kg/m2): 33.7 (31 Jul 2017 09:37)  BSA (m2): 2.16 (31 Jul 2017 09:37)    CENTRAL LINE: [ ] YES [x ] NO  LOCATION:   DATE INSERTED:  REMOVE: [ ] YES [ ] NO  EXPLAIN:    [place]: [ x] YES [ ] NO    DATE INSERTED: July 31  REMOVE:  [ ] YES [ ] NO  EXPLAIN:    A-LINE:  [ ] YES [x ] NO  LOCATION:   DATE INSERTED:  REMOVE:  [ ] YES [ ] NO  EXPLAIN:    PMH -reviewed admission note, no change since admission  PAST MEDICAL & SURGICAL HISTORY:  Hypertension  UTI   Dislocated hip: right hip  Osteoarthritis  Kidney stones  History of hip replacement, total, right  History of pelvic surgery: Pelvic osteotomy - November 1999      ICU Vital Signs Last 24 Hrs  T(C): 37.2 (01 Aug 2017 04:33), Max: 37.3 (31 Jul 2017 23:33)  T(F): 98.9 (01 Aug 2017 04:33), Max: 99.1 (31 Jul 2017 23:33)  HR: 116 (01 Aug 2017 06:00) (75 - 116)  BP: 157/102 (01 Aug 2017 06:00) (112/100 - 165/116)  BP(mean): 113 (01 Aug 2017 06:00) (11 - 118)  ABP: --  ABP(mean): --  RR: 18 (01 Aug 2017 06:00) (15 - 23)  SpO2: 100% (01 Aug 2017 06:00) (98% - 100%)                    PHYSICAL EXAM:    GENERAL: [ ]NAD, [ x]well-groomed, [x ]well-developed  HEAD:  [ x]Atraumatic, [x ]Normocephalic  EYES: [ x]EOMI, [x ]PERRLA, [x ]conjunctiva and sclera clear  ENMT: [x ]No tonsillar erythema, exudates, or enlargement; [x ]Moist mucous membranes  NECK: [ x]Supple, normal appearance, [x ]No JVD; [ x]Normal thyroid; [x ]Trachea midline  NERVOUS SYSTEM:  [x ]Alert & Oriented X3, [x ]Good concentration; [ x]Motor Strength 5/5 B/L upper and lower extremities;   CHEST/LUNG: [x ]No chest deformity; [ x]Normal percussion bilaterally; [x ]No rales, rhonchi, wheezing   HEART: [ x]Regular rate and rhythm; [ x]No murmurs, rubs, or gallops  ABDOMEN: [x ]Left flank and LUQ Tenderness to palpation[x] Nondistended; [x ]Bowel sounds present. Nephrostomy tube left flank connected to bag, hematuria  EXTREMITIES:  [x ]2+ Peripheral Pulses, [x ]No clubbing, cyanosis, or edema  LYMPH: [x ]No lymphadenopathy noted  SKIN: [ x]No rashes or lesions; [ x]Good capillary refill      LABS:  CBC Full  -  ( 01 Aug 2017 06:42 )  WBC Count : 14.0 K/uL  Hemoglobin : 9.9 g/dL  Hematocrit : 29.4 %  Platelet Count - Automated : 237 K/uL  Mean Cell Volume : 99.3 fl  Mean Cell Hemoglobin : 33.4 pg  Mean Cell Hemoglobin Concentration : 33.6 gm/dL  Auto Neutrophil # : 12.3 K/uL  Auto Lymphocyte # : 0.9 K/uL  Auto Monocyte # : 0.7 K/uL  Auto Eosinophil # : 0.0 K/uL  Auto Basophil # : 0.1 K/uL  Auto Neutrophil % : 88.0 %  Auto Lymphocyte % : 6.1 %  Auto Monocyte % : 5.4 %  Auto Eosinophil % : 0.0 %  Auto Basophil % : 0.5 %    08-01    138  |  107  |  8   ----------------------------<  139<H>  3.9   |  23  |  0.97    Ca    7.4<L>      01 Aug 2017 00:24  Phos  2.3     08-01  Mg     1.5     08-01      PT/INR - ( 01 Aug 2017 00:24 )   PT: 11.4 sec;   INR: 1.04 ratio               RADIOLOGY & ADDITIONAL STUDIES REVIEWED:  ***    [ ]GOALS OF CARE DISCUSSION WITH PATIENT/FAMILY/PROXY:    Plan and Assessment:    27 y/o F PMH of HTN (since age 15 y/o, unknown etiology) and left kidney nephrolithiasis, who presents for left percutaneous nephrolithotomy on 7/31/17. Patient was taken to OR and L sided percutaneous nephrolithotomy was performed. After the procedure, patient started to bleed from nephrostomy site and ICU was consulted for post op monitoring and bleeding from the site of the nephrostomy tube .    Patient will be downgraded to [facility].    1) Problem: Bleeding.   Recommendation: bleeding from site of Nephrostomy tube:  H&H stable  site examined: dressing soaked in blood, urologist requested change in dressing.   nephrostomy  attached to bag w/ hematuria  Monitor CBC q8hrs  Hemodynamically stable  UA shows >50 RBC and WBC count of 25-50   WBC count of 16 started on iv ciprofloxacin by urology   Continue w/ iv fluids.    2)Problem: Nephrostomy status.   Recommendation: Nephrostomy status.  Recommendation: POD # 1   c/w pain management: Dilaudid 1mg IV push q4hrs. Morphine was d/c due to nausea/vomiting after administration   Management as per urology.    3)Problem: HTN (hypertension).   Recommendation: htn: BP was elevated post procedure likely due to pain  patient was on atenolol at home  monitor bp c/w pain management  Patient placed on clonidine patch 0.2 mg/24hrs for better control BP.   Metoprolol 5mg IV push q6hrs    4)Problem: Need for prophylactic measure.    Recommendation: no chemical a/c because of bleeding from nephrostomy site.   Will place patient on Protonix 40 mg IV QD          CRITICAL CARE TIME SPENT: 35 minutes

## 2017-08-01 NOTE — CHART NOTE - NSCHARTNOTEFT_GEN_A_CORE
27 y/o F PMH of HTN (since age 15 y/o, unknown etiology) and left kidney nephrolithiasis, who presents for left percutaneous nephrolithotomy on 7/31/17. Patient was taken to OR and L sided percutaneous nephrolithotomy was performed. After the procedure, patient started to bleed from nephrostomy site and ICU was consulted for post op monitoring and bleeding from the site of the nephrostomy tube .        1) post op monitoring for bleeding: s/p L nephrolithotomy  bleeding from site of Nephrostomy tube:  H&H stable  Site examined: dressing soaked in blood, urologist requested change in dressing.   nephrostomy  attached to bag w/ hematuria  Monitor CBC   Hemodynamically stable  UA shows >50 RBC and WBC count of 25-50   WBC count of 16 started on iv ciprofloxacin by urology   Continue w/ iv fluids.    2)Nephrostomy status.    Nephrostomy status.  Recommendation: POD # 1   c/w pain management: Dilaudid 1mg IV push q4hrs. Morphine was d/c due to nausea/vomiting after administration   Management as per urology.    3)Problem: HTN (hypertension).   Recommendation: htn: BP was elevated post procedure likely due to pain  patient was on atenolol at home  monitor bp c/w pain management  Patient placed on clonidine patch 0.2 mg/24hrs for better control BP.   Metoprolol 5mg IV push q6hrs    4)Problem: Need for prophylactic measure.    Recommendation: no chemical a/c because of bleeding from nephrostomy site.   Will place patient on Protonix 40 mg IV QD      Patient stable to be downgraded to the floor as per intensivist.

## 2017-08-01 NOTE — CONSULT NOTE ADULT - PROBLEM SELECTOR RECOMMENDATION 9
bleeding from site of Nephrostomy tube  : h/h stable  site examined . mild soaking of dressing   nephrostomy iste attached to bag that is filled with light blood stained urine  h/h stable monitor cbc q8  f/u inr and type and screen  hemodynamically stable  ua shows >50 rbc and wbc count of 25-50   wbc count of 16 started on iv ciprofloxacin by urololdavid   c/w iv fluids

## 2017-08-01 NOTE — CONSULT NOTE ADULT - ASSESSMENT
27 y/o F PMH of HTN (since age 15 y/o, unknown etiology) and left kidney nephrolithiasis, who presents for left percutaneous nephron lithotomy on 7/31/17. Patient was taken to OR and L sided lithotomy was performed. After the procedure, patient started to bleed from nephrostomy site and ICU was consulted for post op monitoring and bleeding from the site of the nephrostomy tube .

## 2017-08-01 NOTE — PROGRESS NOTE ADULT - PROBLEM SELECTOR PLAN 1
h/h stable cont monitor   2 cc removed from pcn ballooon monitor for bleeding   cont all tubes   iv abx  bp control   pain control

## 2017-08-01 NOTE — PROGRESS NOTE ADULT - SUBJECTIVE AND OBJECTIVE BOX
pt seen in icu [  ], reg med floor [   ], bed [  ], chair at bedside [   ]    REVIEW OF SYSTEMS:    CONSTITUTIONAL: No weakness, fevers or chills  EYES/ENT: No visual changes;  No vertigo or throat pain   NECK: No pain or stiffness  RESPIRATORY: No cough, wheezing, hemoptysis; No shortness of breath  CARDIOVASCULAR: No chest pain or palpitations  GASTROINTESTINAL: No abdominal or epigastric pain. No nausea, vomiting, or hematemesis; No diarrhea or constipation. No melena or hematochezia.  GENITOURINARY: No dysuria, frequency or hematuria  NEUROLOGICAL: No numbness or weakness  SKIN: No itching, burning, rashes, or lesions   All other review of systems is negative unless indicated above.    Physical Exam    General: WN/WD NAD  Neurology: A&Ox3, nonfocal, FU x 4  Respiratory: CTA B/L  CV: RRR, S1S2, no murmurs, rubs or gallops  Abdominal: Soft, NT, ND +BS, Last BM  Extremities: No edema, + peripheral pulses      Allergies  latex (Hives; Rash)  penicillin (Hives)      Health Issues  Calculus of kidney  Hypertension  UTI (urinary tract infection)  Dislocated hip  Osteoarthritis  Kidney stones  History of hip replacement, total, right  History of pelvic surgery      Vitals  T(F): 98.9 (08-01-17 @ 04:33), Max: 99.1 (07-31-17 @ 23:33)  HR: 76 (08-01-17 @ 10:00) (75 - 116)  BP: 132/90 (08-01-17 @ 10:00) (112/100 - 165/116)  RR: 13 (08-01-17 @ 10:00) (12 - 23)  SpO2: 100% (08-01-17 @ 10:00) (99% - 100%)  Wt(kg): --  CAPILLARY BLOOD GLUCOSE          Labs                          9.9    14.0  )-----------( 237      ( 01 Aug 2017 06:42 )             29.4       08-01    135  |  104  |  8   ----------------------------<  134<H>  4.2   |  23  |  0.96    Ca    7.5<L>      01 Aug 2017 06:42  Phos  3.4     08-01  Mg     2.4     08-01              Radiology Results      Meds    MEDICATIONS  (STANDING):  ciprofloxacin   IVPB 400 milliGRAM(s) IV Intermittent every 12 hours  dextrose 5% + sodium chloride 0.45%. 1000 milliLiter(s) (100 mL/Hr) IV Continuous <Continuous>  cloNIDine Patch 0.2 mG/24Hr(s) 1 patch Topical <User Schedule>      MEDICATIONS  (PRN):  acetaminophen   Tablet 650 milliGRAM(s) Oral every 6 hours PRN For Temp greater than 38.5 C (101.3 F)  oxyCODONE    5 mG/acetaminophen 325 mG 1 Tablet(s) Oral every 4 hours PRN Moderate Pain  ondansetron Injectable 4 milliGRAM(s) IV Push every 6 hours PRN Nausea  metoprolol Injectable 5 milliGRAM(s) IV Push every 6 hours PRN when SBP is >150  HYDROmorphone  Injectable 0.5 milliGRAM(s) IV Push every 4 hours PRN Moderate Pain (4 - 6) 26 yea-old, white female with pmh of HTN  and kidney stones, s/p left nephrolithotomy and stent placement.  pt seen in icu [ x ], reg med floor [   ], bed [  ], chair at bedside [   ]  Awake, alert, comfortable in bed in NAD.  REVIEW OF SYSTEMS:    CONSTITUTIONAL: No weakness, fevers or chills  EYES/ENT: No visual changes;  No vertigo or throat pain   NECK: No pain or stiffness  RESPIRATORY: No cough, wheezing, hemoptysis; No shortness of breath  CARDIOVASCULAR: No chest pain or palpitations  GASTROINTESTINAL: No abdominal or epigastric pain. No nausea, vomiting, or hematemesis; No diarrhea or constipation. No melena or hematochezia.  GENITOURINARY: No dysuria, frequency or hematuria  NEUROLOGICAL: No numbness or weakness  SKIN: No itching, burning, rashes, or lesions   All other review of systems is negative unless indicated above.    Physical Exam    General: WN/WD NAD  Neurology: A&Ox3, nonfocal, FU x 4  Respiratory: CTA B/L  CV: RRR, S1S2, no murmurs, rubs or gallops  Abdominal: Soft, left flank tenderness  Extremities: No edema, + peripheral pulses      Allergies  latex (Hives; Rash)  penicillin (Hives)      Health Issues  Calculus of kidney  Hypertension  UTI (urinary tract infection)  Dislocated hip  Osteoarthritis  Kidney stones  History of hip replacement, total, right  History of pelvic surgery      Vitals  T(F): 98.9 (08-01-17 @ 04:33), Max: 99.1 (07-31-17 @ 23:33)  HR: 76 (08-01-17 @ 10:00) (75 - 116)  BP: 132/90 (08-01-17 @ 10:00) (112/100 - 165/116)  RR: 13 (08-01-17 @ 10:00) (12 - 23)  SpO2: 100% (08-01-17 @ 10:00) (99% - 100%)  Wt(kg): --  CAPILLARY BLOOD GLUCOSE          Labs                          9.9    14.0  )-----------( 237      ( 01 Aug 2017 06:42 )             29.4       08-01    135  |  104  |  8   ----------------------------<  134<H>  4.2   |  23  |  0.96    Ca    7.5<L>      01 Aug 2017 06:42  Phos  3.4     08-01  Mg     2.4     08-01              Radiology Results      Meds    MEDICATIONS  (STANDING):  ciprofloxacin   IVPB 400 milliGRAM(s) IV Intermittent every 12 hours  dextrose 5% + sodium chloride 0.45%. 1000 milliLiter(s) (100 mL/Hr) IV Continuous <Continuous>  cloNIDine Patch 0.2 mG/24Hr(s) 1 patch Topical <User Schedule>      MEDICATIONS  (PRN):  acetaminophen   Tablet 650 milliGRAM(s) Oral every 6 hours PRN For Temp greater than 38.5 C (101.3 F)  oxyCODONE    5 mG/acetaminophen 325 mG 1 Tablet(s) Oral every 4 hours PRN Moderate Pain  ondansetron Injectable 4 milliGRAM(s) IV Push every 6 hours PRN Nausea  metoprolol Injectable 5 milliGRAM(s) IV Push every 6 hours PRN when SBP is >150  HYDROmorphone  Injectable 0.5 milliGRAM(s) IV Push every 4 hours PRN Moderate Pain (4 - 6)

## 2017-08-01 NOTE — PROGRESS NOTE ADULT - SUBJECTIVE AND OBJECTIVE BOX
Subjective:26yFemale s/p pcnl postop bleeding   h/h overnight has been stable   c/o pain in left side n/v    [place]:    Vital Signs Last 24 Hrs  T(C): 37.2 (01 Aug 2017 04:33), Max: 37.3 (31 Jul 2017 23:33)  T(F): 98.9 (01 Aug 2017 04:33), Max: 99.1 (31 Jul 2017 23:33)  HR: 108 (01 Aug 2017 07:00) (75 - 116)  BP: 151/106 (01 Aug 2017 07:00) (112/100 - 165/116)  BP(mean): 115 (01 Aug 2017 07:00) (11 - 118)  RR: 19 (01 Aug 2017 07:00) (15 - 23)  SpO2: 100% (01 Aug 2017 07:00) (98% - 100%)  I&O's Detail    31 Jul 2017 07:01  -  01 Aug 2017 07:00  --------------------------------------------------------  IN:    dextrose 5% + sodium chloride 0.45%.: 1260 mL    dextrose 5% + sodium chloride 0.45%.: 600 mL    IV PiggyBack: 200 mL    lactated ringers.: 1485 mL    sodium chloride 0.9%: 960 mL    Solution: 248 mL    Solution: 100 mL    Solution: 400 mL  Total IN: 5253 mL    OUT:    Drain: 700 mL    Indwelling Catheter - Urethral: 1110 mL  Total OUT: 1810 mL    Total NET: 3443 mL          Labs:                        9.9    14.0  )-----------( 237      ( 01 Aug 2017 06:42 )             29.4     08-01    135  |  104  |  8   ----------------------------<  134<H>  4.2   |  23  |  0.96    Ca    7.5<L>      01 Aug 2017 06:42  Phos  3.4     08-01  Mg     2.4     08-01      PT/INR - ( 01 Aug 2017 00:24 )   PT: 11.4 sec;   INR: 1.04 ratio             Physical Exam:  Abdomen: soft luq tenderness    left pcn blood tinged output   dressing soaked but mostly clear  green[place] clearing

## 2017-08-02 LAB
ANION GAP SERPL CALC-SCNC: 7 MMOL/L — SIGNIFICANT CHANGE UP (ref 5–17)
BASOPHILS # BLD AUTO: 0.1 K/UL — SIGNIFICANT CHANGE UP (ref 0–0.2)
BASOPHILS NFR BLD AUTO: 0.8 % — SIGNIFICANT CHANGE UP (ref 0–2)
BUN SERPL-MCNC: 6 MG/DL — LOW (ref 7–18)
CALCIUM SERPL-MCNC: 7.4 MG/DL — LOW (ref 8.4–10.5)
CHLORIDE SERPL-SCNC: 107 MMOL/L — SIGNIFICANT CHANGE UP (ref 96–108)
CO2 SERPL-SCNC: 25 MMOL/L — SIGNIFICANT CHANGE UP (ref 22–31)
CREAT SERPL-MCNC: 1.06 MG/DL — SIGNIFICANT CHANGE UP (ref 0.5–1.3)
EOSINOPHIL # BLD AUTO: 0.1 K/UL — SIGNIFICANT CHANGE UP (ref 0–0.5)
EOSINOPHIL NFR BLD AUTO: 1 % — SIGNIFICANT CHANGE UP (ref 0–6)
GLUCOSE SERPL-MCNC: 101 MG/DL — HIGH (ref 70–99)
HCT VFR BLD CALC: 23.6 % — LOW (ref 34.5–45)
HCT VFR BLD CALC: 24.9 % — LOW (ref 34.5–45)
HGB BLD-MCNC: 7.8 G/DL — LOW (ref 11.5–15.5)
HGB BLD-MCNC: 8.1 G/DL — LOW (ref 11.5–15.5)
LYMPHOCYTES # BLD AUTO: 1.1 K/UL — SIGNIFICANT CHANGE UP (ref 1–3.3)
LYMPHOCYTES # BLD AUTO: 15.7 % — SIGNIFICANT CHANGE UP (ref 13–44)
MAGNESIUM SERPL-MCNC: 2 MG/DL — SIGNIFICANT CHANGE UP (ref 1.6–2.6)
MCHC RBC-ENTMCNC: 32.5 PG — SIGNIFICANT CHANGE UP (ref 27–34)
MCHC RBC-ENTMCNC: 32.7 GM/DL — SIGNIFICANT CHANGE UP (ref 32–36)
MCHC RBC-ENTMCNC: 33.2 GM/DL — SIGNIFICANT CHANGE UP (ref 32–36)
MCHC RBC-ENTMCNC: 33.6 PG — SIGNIFICANT CHANGE UP (ref 27–34)
MCV RBC AUTO: 101.2 FL — HIGH (ref 80–100)
MCV RBC AUTO: 99.4 FL — SIGNIFICANT CHANGE UP (ref 80–100)
MONOCYTES # BLD AUTO: 0.6 K/UL — SIGNIFICANT CHANGE UP (ref 0–0.9)
MONOCYTES NFR BLD AUTO: 9.3 % — SIGNIFICANT CHANGE UP (ref 2–14)
NEUTROPHILS # BLD AUTO: 5 K/UL — SIGNIFICANT CHANGE UP (ref 1.8–7.4)
NEUTROPHILS NFR BLD AUTO: 73.2 % — SIGNIFICANT CHANGE UP (ref 43–77)
PHOSPHATE SERPL-MCNC: 1.6 MG/DL — LOW (ref 2.5–4.5)
PLATELET # BLD AUTO: 173 K/UL — SIGNIFICANT CHANGE UP (ref 150–400)
PLATELET # BLD AUTO: 177 K/UL — SIGNIFICANT CHANGE UP (ref 150–400)
POTASSIUM SERPL-MCNC: 3.9 MMOL/L — SIGNIFICANT CHANGE UP (ref 3.5–5.3)
POTASSIUM SERPL-SCNC: 3.9 MMOL/L — SIGNIFICANT CHANGE UP (ref 3.5–5.3)
RBC # BLD: 2.33 M/UL — LOW (ref 3.8–5.2)
RBC # BLD: 2.5 M/UL — LOW (ref 3.8–5.2)
RBC # FLD: 12.6 % — SIGNIFICANT CHANGE UP (ref 10.3–14.5)
RBC # FLD: 14.9 % — HIGH (ref 10.3–14.5)
SODIUM SERPL-SCNC: 139 MMOL/L — SIGNIFICANT CHANGE UP (ref 135–145)
WBC # BLD: 6.8 K/UL — SIGNIFICANT CHANGE UP (ref 3.8–10.5)
WBC # BLD: 7.5 K/UL — SIGNIFICANT CHANGE UP (ref 3.8–10.5)
WBC # FLD AUTO: 6.8 K/UL — SIGNIFICANT CHANGE UP (ref 3.8–10.5)
WBC # FLD AUTO: 7.5 K/UL — SIGNIFICANT CHANGE UP (ref 3.8–10.5)

## 2017-08-02 PROCEDURE — 99233 SBSQ HOSP IP/OBS HIGH 50: CPT

## 2017-08-02 PROCEDURE — 74176 CT ABD & PELVIS W/O CONTRAST: CPT | Mod: 26

## 2017-08-02 RX ORDER — POTASSIUM PHOSPHATE, MONOBASIC POTASSIUM PHOSPHATE, DIBASIC 236; 224 MG/ML; MG/ML
15 INJECTION, SOLUTION INTRAVENOUS ONCE
Qty: 0 | Refills: 0 | Status: COMPLETED | OUTPATIENT
Start: 2017-08-02 | End: 2017-08-02

## 2017-08-02 RX ORDER — ATENOLOL 25 MG/1
25 TABLET ORAL DAILY
Qty: 0 | Refills: 0 | Status: DISCONTINUED | OUTPATIENT
Start: 2017-08-02 | End: 2017-08-03

## 2017-08-02 RX ADMIN — OXYCODONE AND ACETAMINOPHEN 1 TABLET(S): 5; 325 TABLET ORAL at 11:58

## 2017-08-02 RX ADMIN — HYDROMORPHONE HYDROCHLORIDE 0.5 MILLIGRAM(S): 2 INJECTION INTRAMUSCULAR; INTRAVENOUS; SUBCUTANEOUS at 05:42

## 2017-08-02 RX ADMIN — OXYCODONE AND ACETAMINOPHEN 1 TABLET(S): 5; 325 TABLET ORAL at 12:58

## 2017-08-02 RX ADMIN — PANTOPRAZOLE SODIUM 40 MILLIGRAM(S): 20 TABLET, DELAYED RELEASE ORAL at 11:58

## 2017-08-02 RX ADMIN — OXYCODONE AND ACETAMINOPHEN 1 TABLET(S): 5; 325 TABLET ORAL at 22:26

## 2017-08-02 RX ADMIN — SODIUM CHLORIDE 100 MILLILITER(S): 9 INJECTION, SOLUTION INTRAVENOUS at 05:29

## 2017-08-02 RX ADMIN — HYDROMORPHONE HYDROCHLORIDE 0.5 MILLIGRAM(S): 2 INJECTION INTRAMUSCULAR; INTRAVENOUS; SUBCUTANEOUS at 16:00

## 2017-08-02 RX ADMIN — OXYCODONE AND ACETAMINOPHEN 1 TABLET(S): 5; 325 TABLET ORAL at 18:17

## 2017-08-02 RX ADMIN — Medication 200 MILLIGRAM(S): at 17:03

## 2017-08-02 RX ADMIN — HYDROMORPHONE HYDROCHLORIDE 0.5 MILLIGRAM(S): 2 INJECTION INTRAMUSCULAR; INTRAVENOUS; SUBCUTANEOUS at 15:06

## 2017-08-02 RX ADMIN — POTASSIUM PHOSPHATE, MONOBASIC POTASSIUM PHOSPHATE, DIBASIC 62.5 MILLIMOLE(S): 236; 224 INJECTION, SOLUTION INTRAVENOUS at 10:44

## 2017-08-02 RX ADMIN — SODIUM CHLORIDE 3 MILLILITER(S): 9 INJECTION INTRAMUSCULAR; INTRAVENOUS; SUBCUTANEOUS at 05:34

## 2017-08-02 RX ADMIN — OXYCODONE AND ACETAMINOPHEN 1 TABLET(S): 5; 325 TABLET ORAL at 23:25

## 2017-08-02 RX ADMIN — SODIUM CHLORIDE 3 MILLILITER(S): 9 INJECTION INTRAMUSCULAR; INTRAVENOUS; SUBCUTANEOUS at 21:04

## 2017-08-02 RX ADMIN — Medication 30 MILLIGRAM(S): at 08:11

## 2017-08-02 RX ADMIN — Medication 30 MILLIGRAM(S): at 09:11

## 2017-08-02 RX ADMIN — Medication 200 MILLIGRAM(S): at 05:28

## 2017-08-02 RX ADMIN — OXYCODONE AND ACETAMINOPHEN 1 TABLET(S): 5; 325 TABLET ORAL at 18:48

## 2017-08-02 RX ADMIN — SODIUM CHLORIDE 3 MILLILITER(S): 9 INJECTION INTRAMUSCULAR; INTRAVENOUS; SUBCUTANEOUS at 13:17

## 2017-08-02 RX ADMIN — HYDROMORPHONE HYDROCHLORIDE 0.5 MILLIGRAM(S): 2 INJECTION INTRAMUSCULAR; INTRAVENOUS; SUBCUTANEOUS at 05:27

## 2017-08-02 RX ADMIN — ATENOLOL 25 MILLIGRAM(S): 25 TABLET ORAL at 10:43

## 2017-08-02 NOTE — PROGRESS NOTE ADULT - PROBLEM SELECTOR PLAN 1
- continue with dilaudid iv prn  - d/c toradol due to hematuria  - stool softeners  - oob  - percocet po prn

## 2017-08-02 NOTE — PROGRESS NOTE ADULT - SUBJECTIVE AND OBJECTIVE BOX
26 yea-old, white female with pmh of HTN  and kidney stones, s/p left nephrolithotomy and stent placement.  Awake, alert, comfortable in bed in NAD.      REVIEW OF SYSTEMS:    CONSTITUTIONAL: No weakness, fevers or chills  EYES/ENT: No visual changes;  No vertigo or throat pain   NECK: No pain or stiffness  RESPIRATORY: No cough, wheezing, hemoptysis; No shortness of breath  CARDIOVASCULAR: No chest pain or palpitations  GASTROINTESTINAL: No abdominal or epigastric pain. No nausea, vomiting, or hematemesis; No diarrhea or constipation. No melena or hematochezia.  GENITOURINARY: No dysuria, frequency or hematuria  NEUROLOGICAL: No numbness or weakness  SKIN: No itching, burning, rashes, or lesions   All other review of systems is negative unless indicated above.    Physical Exam    General: WN/WD NAD  Neurology: A&Ox3, nonfocal, FU x 4  Respiratory: CTA B/L  CV: RRR, S1S2, no murmurs, rubs or gallops  Abdominal: Soft, NT, ND +BS, Last BM  Extremities: No edema, + peripheral pulses      Allergies  latex (Hives; Rash)  penicillin (Hives)      Health Issues  Calculus of kidney  Hypertension  UTI (urinary tract infection)  Dislocated hip  Osteoarthritis  Kidney stones  History of hip replacement, total, right  History of pelvic surgery      Vitals  T(F): 98.5 (08-02-17 @ 06:20), Max: 99.6 (08-01-17 @ 20:00)  HR: 104 (08-02-17 @ 06:20) (74 - 104)  BP: 141/84 (08-02-17 @ 06:20) (115/71 - 147/80)  RR: 16 (08-02-17 @ 06:20) (13 - 18)  SpO2: 98% (08-02-17 @ 06:20) (97% - 100%)  Wt(kg): --  CAPILLARY BLOOD GLUCOSE          Labs                          9.9    14.0  )-----------( 237      ( 01 Aug 2017 06:42 )             29.4       08-02    139  |  107  |  6<L>  ----------------------------<  101<H>  3.9   |  25  |  1.06    Ca    7.4<L>      02 Aug 2017 07:32  Phos  1.6     08-02  Mg     2.0     08-02              Radiology Results      Meds    MEDICATIONS  (STANDING):  sodium chloride 0.9% lock flush 3 milliLiter(s) IV Push every 8 hours  ciprofloxacin   IVPB 400 milliGRAM(s) IV Intermittent every 12 hours  dextrose 5% + sodium chloride 0.45%. 1000 milliLiter(s) (100 mL/Hr) IV Continuous <Continuous>  pantoprazole  Injectable 40 milliGRAM(s) IV Push daily  cloNIDine Patch 0.2 mG/24Hr(s) 1 patch Topical <User Schedule>      MEDICATIONS  (PRN):  acetaminophen   Tablet 650 milliGRAM(s) Oral every 6 hours PRN For Temp greater than 38.5 C (101.3 F)  oxyCODONE    5 mG/acetaminophen 325 mG 1 Tablet(s) Oral every 4 hours PRN Moderate Pain  ondansetron Injectable 4 milliGRAM(s) IV Push every 6 hours PRN Nausea  metoprolol Injectable 5 milliGRAM(s) IV Push every 6 hours PRN when SBP is >150  HYDROmorphone  Injectable 0.5 milliGRAM(s) IV Push every 4 hours PRN Moderate Pain (4 - 6)  ketorolac   Injectable 30 milliGRAM(s) IV Push every 6 hours PRN breakthrough pain 26 yea-old, white female with pmh of HTN  and kidney stones, s/p left nephrolithotomy and stent placement.  Awake, alert, comfortable in bed in NAD.Has hematuria via the nephrostomy cath.      REVIEW OF SYSTEMS:    CONSTITUTIONAL: No weakness, fevers or chills  EYES/ENT: No visual changes;  No vertigo or throat pain   NECK: No pain or stiffness  RESPIRATORY: No cough, wheezing, hemoptysis; No shortness of breath  CARDIOVASCULAR: No chest pain or palpitations  GASTROINTESTINAL: No abdominal or epigastric pain. No nausea, vomiting, or hematemesis; No diarrhea or constipation. No melena or hematochezia.  GENITOURINARY: No dysuria, frequency or hematuria  NEUROLOGICAL: No numbness or weakness  SKIN: No itching, burning, rashes, or lesions   All other review of systems is negative unless indicated above.    Physical Exam    General: WN/WD NAD  Neurology: A&Ox3, nonfocal, FU x 4  Respiratory: CTA B/L  CV: RRR, S1S2, no murmurs, rubs or gallops  Abdominal: Soft, NT, ND +BS, Last BM  Extremities: No edema, + peripheral pulses      Allergies  latex (Hives; Rash)  penicillin (Hives)      Health Issues  Calculus of kidney  Hypertension  UTI (urinary tract infection)  Dislocated hip  Osteoarthritis  Kidney stones  History of hip replacement, total, right  History of pelvic surgery      Vitals  T(F): 98.5 (08-02-17 @ 06:20), Max: 99.6 (08-01-17 @ 20:00)  HR: 104 (08-02-17 @ 06:20) (74 - 104)  BP: 141/84 (08-02-17 @ 06:20) (115/71 - 147/80)  RR: 16 (08-02-17 @ 06:20) (13 - 18)  SpO2: 98% (08-02-17 @ 06:20) (97% - 100%)  Wt(kg): --  CAPILLARY BLOOD GLUCOSE          Labs                          9.9    14.0  )-----------( 237      ( 01 Aug 2017 06:42 )             29.4       08-02    139  |  107  |  6<L>  ----------------------------<  101<H>  3.9   |  25  |  1.06    Ca    7.4<L>      02 Aug 2017 07:32  Phos  1.6     08-02  Mg     2.0     08-02              Radiology Results      Meds    MEDICATIONS  (STANDING):  sodium chloride 0.9% lock flush 3 milliLiter(s) IV Push every 8 hours  ciprofloxacin   IVPB 400 milliGRAM(s) IV Intermittent every 12 hours  dextrose 5% + sodium chloride 0.45%. 1000 milliLiter(s) (100 mL/Hr) IV Continuous <Continuous>  pantoprazole  Injectable 40 milliGRAM(s) IV Push daily  cloNIDine Patch 0.2 mG/24Hr(s) 1 patch Topical <User Schedule>      MEDICATIONS  (PRN):  acetaminophen   Tablet 650 milliGRAM(s) Oral every 6 hours PRN For Temp greater than 38.5 C (101.3 F)  oxyCODONE    5 mG/acetaminophen 325 mG 1 Tablet(s) Oral every 4 hours PRN Moderate Pain  ondansetron Injectable 4 milliGRAM(s) IV Push every 6 hours PRN Nausea  metoprolol Injectable 5 milliGRAM(s) IV Push every 6 hours PRN when SBP is >150  HYDROmorphone  Injectable 0.5 milliGRAM(s) IV Push every 4 hours PRN Moderate Pain (4 - 6)  ketorolac   Injectable 30 milliGRAM(s) IV Push every 6 hours PRN breakthrough pain

## 2017-08-02 NOTE — PHYSICAL THERAPY INITIAL EVALUATION ADULT - CRITERIA FOR SKILLED THERAPEUTIC INTERVENTIONS
predicted duration of therapy intervention/impairments found/rehab potential/functional limitations in following categories

## 2017-08-02 NOTE — PROGRESS NOTE ADULT - SUBJECTIVE AND OBJECTIVE BOX
INTERVAL HPI/OVERNIGHT EVENTS:    Pt s/p lt PCNL, lt stent, Lt nephrostomy tube placement, POD#2. Pt seen and examined at bedside. Admits to headache, no nausea or vomiting, no abd pain. Admits to incisional pain. No fever, chills, SOB or CP.      Vital Signs Last 24 Hrs  T(C): 36.9 (02 Aug 2017 06:20), Max: 37.6 (01 Aug 2017 20:00)  T(F): 98.5 (02 Aug 2017 06:20), Max: 99.6 (01 Aug 2017 20:00)  HR: 104 (02 Aug 2017 06:20) (74 - 104)  BP: 141/84 (02 Aug 2017 06:20) (115/71 - 147/80)  BP(mean): 89 (01 Aug 2017 20:00) (80 - 101)  RR: 16 (02 Aug 2017 06:20) (12 - 18)  SpO2: 98% (02 Aug 2017 06:20) (97% - 100%)  I&O's Detail    01 Aug 2017 07:01  -  02 Aug 2017 07:00  --------------------------------------------------------  IN:    dextrose 5% + sodium chloride 0.45%.: 2100 mL  Total IN: 2100 mL    OUT:    Drain: 860 mL    Indwelling Catheter - Urethral: 1755 mL  Total OUT: 2615 mL    Total NET: -515 mL        sodium chloride 0.9% lock flush 3 milliLiter(s) IV Push every 8 hours  ciprofloxacin   IVPB 400 milliGRAM(s) IV Intermittent every 12 hours  pantoprazole  Injectable 40 milliGRAM(s) IV Push daily      Physical Exam  General: AAOx3, No acute distress  Abdomen: soft,  nondistended, nontender, no rebound tenderness, no guarding, no palpable masses  Back: Lt NT in place, incisional TTP, dressing c/d/i, UO blood tinged   : Normal external genitalia, [place] in place, UO blood tinged  Extremities: non edematous, no calf pain bilaterally      Labs:                        9.9    14.0  )-----------( 237      ( 01 Aug 2017 06:42 )             29.4     08-02    139  |  107  |  6<L>  ----------------------------<  101<H>  3.9   |  25  |  1.06    Ca    7.4<L>      02 Aug 2017 07:32  Phos  1.6     08-02  Mg     2.0     08-02      PT/INR - ( 01 Aug 2017 00:24 )   PT: 11.4 sec;   INR: 1.04 ratio

## 2017-08-02 NOTE — PROGRESS NOTE ADULT - PROBLEM SELECTOR PLAN 1
Monidtor urine output  Urology follow up  Pain control  Catheter care Monitor urine output  Urology follow up  Pain control  Catheter care

## 2017-08-02 NOTE — CONSULT NOTE ADULT - ASSESSMENT
26year old female with pmhx of hypertension, dislocated right hip, kidney stone, osteoarthritis and UTI presents today  for left percutaneous nephrolithotomy on 7/31/17 ,post op HTN and now anemia.  1.CT scan-p.  2.Echocardiogram.  3.Transfuse 2 units PRBC.  4.D/C clonidine patch.  5.Agree with B-Blocker.  6.GI and DVT prophylaxis.

## 2017-08-02 NOTE — CONSULT NOTE ADULT - SUBJECTIVE AND OBJECTIVE BOX
CHIEF COMPLAINT:Patient is a 26y old  Female who presents with a chief complaint of 'Pre surgical testing - kidney surgery".      HPI:  26year old female with pmhx of hypertension, dislocated right hip, kidney stone, osteoarthritis and UTI presents today  for left percutaneous nephrolithotomy on 7/31/17 .Pt was taken off BP medication by PCP-6 months ago. She was tachycardic and hypertensive post-op,placed on clonidine patch.      PAST MEDICAL & SURGICAL HISTORY:  Hypertension-since age 12  UTI (urinary tract infection)  Dislocated hip: right hip  Osteoarthritis  Kidney stones  History of hip replacement, total, right  History of pelvic surgery: Pelvic osteotomy - November 1999      MEDICATIONS  (STANDING):  sodium chloride 0.9% lock flush 3 milliLiter(s) IV Push every 8 hours  ciprofloxacin   IVPB 400 milliGRAM(s) IV Intermittent every 12 hours  dextrose 5% + sodium chloride 0.45%. 1000 milliLiter(s) (100 mL/Hr) IV Continuous <Continuous>  pantoprazole  Injectable 40 milliGRAM(s) IV Push daily  cloNIDine Patch 0.2 mG/24Hr(s) 1 patch Topical <User Schedule>  ATENolol  Tablet 25 milliGRAM(s) Oral daily    MEDICATIONS  (PRN):  acetaminophen   Tablet 650 milliGRAM(s) Oral every 6 hours PRN For Temp greater than 38.5 C (101.3 F)  oxyCODONE    5 mG/acetaminophen 325 mG 1 Tablet(s) Oral every 4 hours PRN Moderate Pain  ondansetron Injectable 4 milliGRAM(s) IV Push every 6 hours PRN Nausea  HYDROmorphone  Injectable 0.5 milliGRAM(s) IV Push every 4 hours PRN Moderate Pain (4 - 6)      FAMILY HISTORY:  Mother:HTN,DM,thyroid dz  Grandmother:HTN  Multiple maternal family members with HTN      SOCIAL HISTORY:    [ X] Non-smoker    [X ] Alcohol-social    Allergies    latex (Hives; Rash)  penicillin (Hives)    Intolerances    	    REVIEW OF SYSTEMS:  CONSTITUTIONAL: No fever, weight loss, or fatigue  EYES: No eye pain, visual disturbances, or discharge  ENT:  No difficulty hearing, tinnitus, vertigo; No sinus or throat pain  NECK: No pain or stiffness  RESPIRATORY: No cough, wheezing, chills or hemoptysis; No Shortness of Breath  CARDIOVASCULAR: No chest pain, palpitations, passing out, dizziness, or leg swelling  GASTROINTESTINAL: No abdominal or epigastric pain. No nausea, vomiting, or hematemesis; No diarrhea or constipation. No melena or hematochezia.  GENITOURINARY: No dysuria, frequency, hematuria, or incontinence  NEUROLOGICAL: No headaches, memory loss, loss of strength, numbness, or tremors  SKIN: No itching, burning, rashes, or lesions   LYMPH Nodes: No enlarged glands  ENDOCRINE: No heat or cold intolerance; No hair loss  MUSCULOSKELETAL: No joint pain or swelling; No muscle, back, or extremity pain  PSYCHIATRIC: No depression, anxiety, mood swings, or difficulty sleeping  HEME/LYMPH: No easy bruising, or bleeding gums  ALLERGY AND IMMUNOLOGIC: No hives or eczema	      PHYSICAL EXAM:  T(C): 36.9 (08-02-17 @ 06:20), Max: 37.6 (08-01-17 @ 20:00)  HR: 83 (08-02-17 @ 11:17) (76 - 104)  BP: 132/82 (08-02-17 @ 11:17) (115/71 - 147/80)  RR: 16 (08-02-17 @ 06:20) (13 - 18)  SpO2: 100% (08-02-17 @ 11:17) (97% - 100%)  Wt(kg): --  I&O's Summary    01 Aug 2017 07:01  -  02 Aug 2017 07:00  --------------------------------------------------------  IN: 2100 mL / OUT: 2615 mL / NET: -515 mL        Appearance: Normal	  HEENT:   Normal oral mucosa, PERRL, EOMI	  Lymphatic: No lymphadenopathy  Cardiovascular: Normal S1 S2, No JVD, No murmurs, No edema  Respiratory: Lungs clear to auscultation	  Psychiatry: A & O x 3, Mood & affect appropriate  Gastrointestinal:  Soft, Non-tender, + BS	  Skin: No rashes, No ecchymoses, No cyanosis	  Neurologic: Non-focal  Extremities: Normal range of motion, No clubbing, cyanosis or edema  Vascular: Peripheral pulses palpable 2+ bilaterally    	    	      LABS:	 	                          7.8    6.8   )-----------( 173      ( 02 Aug 2017 07:32 )             23.6     08-02    139  |  107  |  6<L>  ----------------------------<  101<H>  3.9   |  25  |  1.06    Ca    7.4<L>      02 Aug 2017 07:32  Phos  1.6     08-02  Mg     2.0     08-02

## 2017-08-02 NOTE — PHYSICAL THERAPY INITIAL EVALUATION ADULT - LIVES WITH, PROFILE
spouse/children/Patient lives in a house with fiance and three children. Patient has 5 steps to enter and 10 steps to get to her room on 2nd floor, + R handrail.

## 2017-08-02 NOTE — PROGRESS NOTE ADULT - SUBJECTIVE AND OBJECTIVE BOX
Chief Complaint: post op pain    HPI:   26y Female s/p left percutaneous nephrolithiasis, POD#2, + s/p nephrostomy tube.  +gross hematuria.  No nausea or vomiting.  No chest pain or sob.  Pt has history of hypertension but was off bb prior to surgery.  Pt is now being followed by cardiology and medicine for post operative hypertension.  + decrease in H/H - pt to get blood transfusion today.  Pt ambulating around unit with walker.        PAIN SCORE:      5/10   SCALE USED: (1-10 VNRS)    Allergies    latex (Hives; Rash)  penicillin (Hives)    Intolerances      MEDICATIONS  (STANDING):  sodium chloride 0.9% lock flush 3 milliLiter(s) IV Push every 8 hours  ciprofloxacin   IVPB 400 milliGRAM(s) IV Intermittent every 12 hours  dextrose 5% + sodium chloride 0.45%. 1000 milliLiter(s) (100 mL/Hr) IV Continuous <Continuous>  pantoprazole  Injectable 40 milliGRAM(s) IV Push daily  cloNIDine Patch 0.2 mG/24Hr(s) 1 patch Topical <User Schedule>  ATENolol  Tablet 25 milliGRAM(s) Oral daily    MEDICATIONS  (PRN):  acetaminophen   Tablet 650 milliGRAM(s) Oral every 6 hours PRN For Temp greater than 38.5 C (101.3 F)  oxyCODONE    5 mG/acetaminophen 325 mG 1 Tablet(s) Oral every 4 hours PRN Moderate Pain  ondansetron Injectable 4 milliGRAM(s) IV Push every 6 hours PRN Nausea  HYDROmorphone  Injectable 0.5 milliGRAM(s) IV Push every 4 hours PRN Moderate Pain (4 - 6)      PHYSICAL EXAM:    Vital Signs Last 24 Hrs  T(C): 36.9 (02 Aug 2017 06:20), Max: 37.6 (01 Aug 2017 20:00)  T(F): 98.5 (02 Aug 2017 06:20), Max: 99.6 (01 Aug 2017 20:00)  HR: 83 (02 Aug 2017 11:17) (76 - 104)  BP: 132/82 (02 Aug 2017 11:17) (115/71 - 147/80)  BP(mean): 89 (01 Aug 2017 20:00) (80 - 101)  RR: 16 (02 Aug 2017 06:20) (13 - 18)  SpO2: 100% (02 Aug 2017 11:17) (97% - 100%)             LABS:                          7.8    6.8   )-----------( 173      ( 02 Aug 2017 07:32 )             23.6     08-02    139  |  107  |  6<L>  ----------------------------<  101<H>  3.9   |  25  |  1.06    Ca    7.4<L>      02 Aug 2017 07:32  Phos  1.6     08-02  Mg     2.0     08-02      PT/INR - ( 01 Aug 2017 00:24 )   PT: 11.4 sec;   INR: 1.04 ratio               Drug Screen:        RADIOLOGY: Chief Complaint: post op pain    HPI:   26y Female s/p left percutaneous nephrolithiasis, POD#2, + s/p nephrostomy tube.  +gross hematuria.  No nausea or vomiting.  No chest pain or sob.  Pt has history of hypertension but was off bb prior to surgery.  Pt is now being followed by cardiology and medicine for post operative hypertension.  + decrease in H/H - pt to get blood transfusion today.  Pt ambulating around unit with walker.        PAIN SCORE:      5/10   SCALE USED: (1-10 VNRS)    Allergies    latex (Hives; Rash)  penicillin (Hives)    Intolerances      MEDICATIONS  (STANDING):  sodium chloride 0.9% lock flush 3 milliLiter(s) IV Push every 8 hours  ciprofloxacin   IVPB 400 milliGRAM(s) IV Intermittent every 12 hours  dextrose 5% + sodium chloride 0.45%. 1000 milliLiter(s) (100 mL/Hr) IV Continuous <Continuous>  pantoprazole  Injectable 40 milliGRAM(s) IV Push daily  cloNIDine Patch 0.2 mG/24Hr(s) 1 patch Topical <User Schedule>  ATENolol  Tablet 25 milliGRAM(s) Oral daily    MEDICATIONS  (PRN):  acetaminophen   Tablet 650 milliGRAM(s) Oral every 6 hours PRN For Temp greater than 38.5 C (101.3 F)  oxyCODONE    5 mG/acetaminophen 325 mG 1 Tablet(s) Oral every 4 hours PRN Moderate Pain  ondansetron Injectable 4 milliGRAM(s) IV Push every 6 hours PRN Nausea  HYDROmorphone  Injectable 0.5 milliGRAM(s) IV Push every 4 hours PRN Moderate Pain (4 - 6)      PHYSICAL EXAM:    Vital Signs Last 24 Hrs  T(C): 36.9 (02 Aug 2017 06:20), Max: 37.6 (01 Aug 2017 20:00)  T(F): 98.5 (02 Aug 2017 06:20), Max: 99.6 (01 Aug 2017 20:00)  HR: 83 (02 Aug 2017 11:17) (76 - 104)  BP: 132/82 (02 Aug 2017 11:17) (115/71 - 147/80)  BP(mean): 89 (01 Aug 2017 20:00) (80 - 101)  RR: 16 (02 Aug 2017 06:20) (13 - 18)  SpO2: 100% (02 Aug 2017 11:17) (97% - 100%)             LABS:                          7.8    6.8   )-----------( 173      ( 02 Aug 2017 07:32 )             23.6     08-02    139  |  107  |  6<L>  ----------------------------<  101<H>  3.9   |  25  |  1.06    Ca    7.4<L>      02 Aug 2017 07:32  Phos  1.6     08-02  Mg     2.0     08-02      PT/INR - ( 01 Aug 2017 00:24 )   PT: 11.4 sec;   INR: 1.04 ratio               Drug Screen:        RADIOLOGY:    < from: CT Abdomen and Pelvis No Cont (08.02.17 @ 11:55) >  EXAM:  CT ABDOMEN AND PELVIS                            PROCEDURE DATE:  08/02/2017          INTERPRETATION:  CT of the abdomen and pelvis without IV or oral contrast    Clinical Indication: evalaute for renal stones. Status post percutaneous   nephrostomy with stent placement.    Technique: Axial multidetector CT images of the abdomen and pelvis are   acquired without IV or oral contrast.    Comparison: 7/12/2017.    Findings:    Abdomen: Limited sections through the lung bases demonstrate newsmall   left pleural effusion and mild bilateral atelectasis. No evidence for a   calculus in the ureters or right kidney. Previously, there was a staghorn   calculus in the left kidney. There is interval fragmentation of the   staghorn calculus withsmall residual calculi in the left kidney   measuring up to 9 mm.    Interval placement of a percutaneous left nephrostomy tube with its   retainer balloon in the left renal pelvis and its distal tip in the left   distal ureter. There is also interval placement of a double-J left   ureteral stent with the proximal tip in the left renal pelvis and distal   tip in the urinary bladder. There is a new crescentic hyperdensity with a   Hounsfield unit of 65 at the periphery of the left kidney, suggestive of   a subcapsular hemorrhage. There is also a retroperitoneal hemorrhage in   the left posterior pararenal space with mild extension along the left   psoas muscle. The largest component of this left retroperitoneal   hemorrhage measures approximately 5.9 x 1.5 cm in maximum cross-section.    Mild hyperdensity in the left renal calyces and renal pelvis may   represent hemorrhage or diluted contrast from recent nephrostomy   placement.    Small amount of air is noted in the left renal pelvis andleft   perinephric space, likely due to recent nephrostomy placement.    There is vicarious excretion of IV contrast in the gallbladder.    Allowing for the noncontrast technique, the liver, pancreas, spleen, and   adrenals appear grossly unremarkable.    The appendix appears normal. No evidence for bowel obstruction or grossly   thickened bowel wall. No evidence for free air or enlarged lymph node    Pelvis: Streaky artifact from right hip replacement limits evaluation.   There is a new Montano catheter in the urinary bladder which is collapsed.   The uterus appears grossly unremarkable. The mild pelvic free fluid,   increased since the previous examination. The bowel structures are   grossly intact. No enlarged pelvic lymph node.    BilateralL5 spondylolysis.    Impression: New small left pleural effusion.    No evidence for a calculus in the ureters or right kidney. Previously,   there was a staghorn calculus in the left kidney. There is interval   fragmentation of the staghorn calculuswith small residual calculi in the   left kidney measuring up to 9 mm.    Interval placement of a percutaneous left nephrostomy tube with its   retainer balloon in the left renal pelvis and its distal tip in the left   distal ureter. There is also interval placement of a double-J left   ureteral stent.     New crescentic hyperdensity at the periphery of the left kidney,   suggestive of a subcapsular hemorrhage. There is also a retroperitoneal   hemorrhage in the left posterior pararenal space with mild extension   along the left psoas muscle. The largest component of this left   retroperitoneal hemorrhage measures approximately 5.9 x 1.5 cm in maximum   cross-section.    Mild hyperdensity in the left renal calyces and renal pelvis may   represent hemorrhage or diluted contrast from recent nephrostomy   placement.    Mild pelvic free fluid, increased since the previous examination.    < end of copied text >

## 2017-08-02 NOTE — PROGRESS NOTE ADULT - PROBLEM SELECTOR PLAN 1
Pain medication PRN  C/w Montano  Monitor NT output   Monitor h/h levels, transfuse PRN   C/w IV abx   BP control  Regular diet

## 2017-08-03 ENCOUNTER — TRANSCRIPTION ENCOUNTER (OUTPATIENT)
Age: 27
End: 2017-08-03

## 2017-08-03 VITALS
DIASTOLIC BLOOD PRESSURE: 77 MMHG | HEART RATE: 81 BPM | SYSTOLIC BLOOD PRESSURE: 135 MMHG | RESPIRATION RATE: 16 BRPM | TEMPERATURE: 98 F | OXYGEN SATURATION: 100 %

## 2017-08-03 DIAGNOSIS — R58 HEMORRHAGE, NOT ELSEWHERE CLASSIFIED: ICD-10-CM

## 2017-08-03 LAB
ANION GAP SERPL CALC-SCNC: 5 MMOL/L — SIGNIFICANT CHANGE UP (ref 5–17)
BUN SERPL-MCNC: 7 MG/DL — SIGNIFICANT CHANGE UP (ref 7–18)
CALCIUM SERPL-MCNC: 7.6 MG/DL — LOW (ref 8.4–10.5)
CHLORIDE SERPL-SCNC: 109 MMOL/L — HIGH (ref 96–108)
CO2 SERPL-SCNC: 27 MMOL/L — SIGNIFICANT CHANGE UP (ref 22–31)
CREAT SERPL-MCNC: 0.87 MG/DL — SIGNIFICANT CHANGE UP (ref 0.5–1.3)
GLUCOSE SERPL-MCNC: 102 MG/DL — HIGH (ref 70–99)
HCT VFR BLD CALC: 23.6 % — LOW (ref 34.5–45)
HCT VFR BLD CALC: 26 % — LOW (ref 34.5–45)
HGB BLD-MCNC: 7.7 G/DL — LOW (ref 11.5–15.5)
HGB BLD-MCNC: 8.4 G/DL — LOW (ref 11.5–15.5)
MCHC RBC-ENTMCNC: 31.4 PG — SIGNIFICANT CHANGE UP (ref 27–34)
MCHC RBC-ENTMCNC: 32.3 GM/DL — SIGNIFICANT CHANGE UP (ref 32–36)
MCHC RBC-ENTMCNC: 32.4 PG — SIGNIFICANT CHANGE UP (ref 27–34)
MCHC RBC-ENTMCNC: 32.6 GM/DL — SIGNIFICANT CHANGE UP (ref 32–36)
MCV RBC AUTO: 97.4 FL — SIGNIFICANT CHANGE UP (ref 80–100)
MCV RBC AUTO: 99.1 FL — SIGNIFICANT CHANGE UP (ref 80–100)
PLATELET # BLD AUTO: 169 K/UL — SIGNIFICANT CHANGE UP (ref 150–400)
PLATELET # BLD AUTO: 210 K/UL — SIGNIFICANT CHANGE UP (ref 150–400)
POTASSIUM SERPL-MCNC: 4.1 MMOL/L — SIGNIFICANT CHANGE UP (ref 3.5–5.3)
POTASSIUM SERPL-SCNC: 4.1 MMOL/L — SIGNIFICANT CHANGE UP (ref 3.5–5.3)
RBC # BLD: 2.38 M/UL — LOW (ref 3.8–5.2)
RBC # BLD: 2.66 M/UL — LOW (ref 3.8–5.2)
RBC # FLD: 14.6 % — HIGH (ref 10.3–14.5)
RBC # FLD: 14.6 % — HIGH (ref 10.3–14.5)
SODIUM SERPL-SCNC: 141 MMOL/L — SIGNIFICANT CHANGE UP (ref 135–145)
WBC # BLD: 6 K/UL — SIGNIFICANT CHANGE UP (ref 3.8–10.5)
WBC # BLD: 8.8 K/UL — SIGNIFICANT CHANGE UP (ref 3.8–10.5)
WBC # FLD AUTO: 6 K/UL — SIGNIFICANT CHANGE UP (ref 3.8–10.5)
WBC # FLD AUTO: 8.8 K/UL — SIGNIFICANT CHANGE UP (ref 3.8–10.5)

## 2017-08-03 RX ORDER — MOXIFLOXACIN HYDROCHLORIDE TABLETS, 400 MG 400 MG/1
1 TABLET, FILM COATED ORAL
Qty: 20 | Refills: 0 | OUTPATIENT
Start: 2017-08-03 | End: 2017-08-13

## 2017-08-03 RX ORDER — OXYCODONE HYDROCHLORIDE 5 MG/1
1 TABLET ORAL
Qty: 20 | Refills: 0 | OUTPATIENT
Start: 2017-08-03 | End: 2017-08-08

## 2017-08-03 RX ORDER — IBUPROFEN 200 MG
4 TABLET ORAL
Qty: 0 | Refills: 0 | COMMUNITY

## 2017-08-03 RX ORDER — ATENOLOL 25 MG/1
1 TABLET ORAL
Qty: 30 | Refills: 0 | OUTPATIENT
Start: 2017-08-03 | End: 2017-09-02

## 2017-08-03 RX ADMIN — SODIUM CHLORIDE 3 MILLILITER(S): 9 INJECTION INTRAMUSCULAR; INTRAVENOUS; SUBCUTANEOUS at 06:00

## 2017-08-03 RX ADMIN — SODIUM CHLORIDE 3 MILLILITER(S): 9 INJECTION INTRAMUSCULAR; INTRAVENOUS; SUBCUTANEOUS at 12:18

## 2017-08-03 RX ADMIN — OXYCODONE AND ACETAMINOPHEN 1 TABLET(S): 5; 325 TABLET ORAL at 16:40

## 2017-08-03 RX ADMIN — Medication 200 MILLIGRAM(S): at 06:01

## 2017-08-03 RX ADMIN — ATENOLOL 25 MILLIGRAM(S): 25 TABLET ORAL at 06:01

## 2017-08-03 RX ADMIN — OXYCODONE AND ACETAMINOPHEN 1 TABLET(S): 5; 325 TABLET ORAL at 05:32

## 2017-08-03 RX ADMIN — OXYCODONE AND ACETAMINOPHEN 1 TABLET(S): 5; 325 TABLET ORAL at 15:37

## 2017-08-03 RX ADMIN — OXYCODONE AND ACETAMINOPHEN 1 TABLET(S): 5; 325 TABLET ORAL at 10:21

## 2017-08-03 RX ADMIN — Medication 200 MILLIGRAM(S): at 17:08

## 2017-08-03 RX ADMIN — OXYCODONE AND ACETAMINOPHEN 1 TABLET(S): 5; 325 TABLET ORAL at 10:06

## 2017-08-03 RX ADMIN — OXYCODONE AND ACETAMINOPHEN 1 TABLET(S): 5; 325 TABLET ORAL at 04:36

## 2017-08-03 NOTE — DISCHARGE NOTE ADULT - HOSPITAL COURSE
27 y.o. F w/ left nephrolithiasis underwent left PCNL 7/31/17. In PACU, pt's nephrostomy dressing was found to be saturated with blood. Nephrostomy balloon was further dilated 27 y.o. F w/ left nephrolithiasis underwent left PCNL 7/31/17. In PACU, pt's nephrostomy dressing was found to be saturated with blood. Nephrostomy balloon was further dilated and the new dressing stayed clean, dry. Pt transferred to surgical floor. Pt remained stable until POD#2 when hct dropped from 30 to 23.6. CT abd/pelvis to evaluate for further nephrolithiasis showed a residual renal calculi and a 5.9cm left retroperitoneal hemorrhage adjacent to left kidney. Pt transfused 1unit. Pt hct eventually responded approriately, discharged POD#3 to follow up with Dr. Casillas for further PCNL

## 2017-08-03 NOTE — DISCHARGE NOTE ADULT - CARE PLAN
Principal Discharge DX:	Nephrolithiasis  Goal:	Extraction of kidney stones  Instructions for follow-up, activity and diet:	Keep nephrostomy tube to leg bag. Empty when full, recording amount each time. Complete antibiotics course. Await for Dr. Casillas to schedule PCNL  Secondary Diagnosis:	HTN (hypertension)  Instructions for follow-up, activity and diet:	continue Atenolol

## 2017-08-03 NOTE — PROGRESS NOTE ADULT - ATTENDING COMMENTS
27 y/o F PMH of HTN (since age 15 y/o, unknown etiology) and left kidney nephrolithiasis, who presents for left percutaneous nephrolithotomy on 7/31/17. Patient was taken to OR and L sided percutaneous nephrolithotomy was performed. After the procedure, patient started to bleed from nephrostomy site and ICU was consulted for post op monitoring and bleeding from the site of the nephrostomy tube .    Patient will be downgraded to [facility].    1) Problem: Bleeding.   Recommendation: bleeding from site of Nephrostomy tube:  H&H stable  site examined: dressing soaked in blood, urologist requested change in dressing.   nephrostomy  attached to bag w/ hematuria  Monitor CBC q8hrs  Follow up INR and type and screen  Hemodynamically stable  UA shows >50 RBC and WBC count of 25-50   WBC count of 16 started on iv ciprofloxacin by urology   Continue w/ iv fluids.    2)Problem: Nephrostomy status.   Recommendation: Nephrostomy status.  Recommendation: POD # 1   c/w pain management: Dilaudid 1mg IV push q4hrs. Morphine was d/c due to nausea/vomiting after administration   Management as per urology.    3)Problem: HTN (hypertension).   Recommendation: htn: BP was elevated post procedure likely due to pain  patient was on atenolol at home  monitor bp c/w pain management  Patient placed on clonidine patch 0.2 mg/24hrs for better control BP.   Metoprolol 5mg IV push q6hrs
doing well  urine pretty clear   hold fluids and repeat cbc in pm   ambulate   if h/h stable can d/c with pcn   plan for second look next week

## 2017-08-03 NOTE — PROGRESS NOTE ADULT - SUBJECTIVE AND OBJECTIVE BOX
CHIEF COMPLAINT:Patient is a 27y old  Female who presents with a chief complaint of 'Pre surgical testing - kidney surgery" .Pt has no complaints.    	  REVIEW OF SYSTEMS:  CONSTITUTIONAL: No fever, weight loss, or fatigue  EYES: No eye pain, visual disturbances, or discharge  ENT:  No difficulty hearing, tinnitus, vertigo; No sinus or throat pain  NECK: No pain or stiffness  RESPIRATORY: No cough, wheezing, chills or hemoptysis; No Shortness of Breath  CARDIOVASCULAR: No chest pain, palpitations, passing out, dizziness, or leg swelling  GASTROINTESTINAL: No abdominal or epigastric pain. No nausea, vomiting, or hematemesis; No diarrhea or constipation. No melena or hematochezia.  GENITOURINARY: No dysuria, frequency, hematuria, or incontinence  NEUROLOGICAL: No headaches, memory loss, loss of strength, numbness, or tremors  SKIN: No itching, burning, rashes, or lesions   LYMPH Nodes: No enlarged glands  ENDOCRINE: No heat or cold intolerance; No hair loss  MUSCULOSKELETAL: No joint pain or swelling; No muscle, back, or extremity pain  PSYCHIATRIC: No depression, anxiety, mood swings, or difficulty sleeping  HEME/LYMPH: No easy bruising, or bleeding gums  ALLERGY AND IMMUNOLOGIC: No hives or eczema	    PHYSICAL EXAM:  T(C): 36.8 (08-03-17 @ 05:46), Max: 36.8 (08-02-17 @ 13:28)  HR: 79 (08-03-17 @ 05:46) (77 - 82)  BP: 127/79 (08-03-17 @ 05:46) (127/79 - 134/84)  RR: 18 (08-03-17 @ 05:46) (16 - 18)  SpO2: 100% (08-03-17 @ 05:46) (100% - 100%)    I&O's Summary    02 Aug 2017 07:01  -  03 Aug 2017 07:00  --------------------------------------------------------  IN: 0 mL / OUT: 600 mL / NET: -600 mL        Appearance: Normal	  HEENT:   Normal oral mucosa, PERRL, EOMI	  Lymphatic: No lymphadenopathy  Cardiovascular: Normal S1 S2, No JVD, No murmurs, No edema  Respiratory: Lungs clear to auscultation	  Psychiatry: A & O x 3, Mood & affect appropriate  Gastrointestinal:  Soft, Non-tender, + BS	  Skin: No rashes, No ecchymoses, No cyanosis	  Neurologic: Non-focal  Extremities: Normal range of motion, No clubbing, cyanosis or edema  Vascular: Peripheral pulses palpable 2+ bilaterally    MEDICATIONS  (STANDING):  sodium chloride 0.9% lock flush 3 milliLiter(s) IV Push every 8 hours  ciprofloxacin   IVPB 400 milliGRAM(s) IV Intermittent every 12 hours  pantoprazole  Injectable 40 milliGRAM(s) IV Push daily  ATENolol  Tablet 25 milliGRAM(s) Oral daily      	  LABS:	 	                      7.7    6.0   )-----------( 169      ( 03 Aug 2017 06:19 )             23.6     08-03    141  |  109<H>  |  7   ----------------------------<  102<H>  4.1   |  27  |  0.87    Ca    7.6<L>      03 Aug 2017 06:19  Phos  1.6     08-02  Mg     2.0     08-02      EXAM:  CT ABDOMEN AND PELVIS                            PROCEDURE DATE:  08/02/2017          INTERPRETATION:  CT of the abdomen and pelvis without IV or oral contrast    Clinical Indication: evalaute for renal stones. Status post percutaneous   nephrostomy with stent placement.    Technique: Axial multidetector CT images of the abdomen and pelvis are   acquired without IV or oral contrast.    Comparison: 7/12/2017.    Findings:    Abdomen: Limited sections through the lung bases demonstrate newsmall   left pleural effusion and mild bilateral atelectasis. No evidence for a   calculus in the ureters or right kidney. Previously, there was a staghorn   calculus in the left kidney. There is interval fragmentation of the   staghorn calculus withsmall residual calculi in the left kidney   measuring up to 9 mm.    Interval placement of a percutaneous left nephrostomy tube with its   retainer balloon in the left renal pelvis and its distal tip in the left   distal ureter. There is also interval placement of a double-J left   ureteral stent with the proximal tip in the left renal pelvis and distal   tip in the urinary bladder. There is a new crescentic hyperdensity with a   Hounsfield unit of 65 at the periphery of the left kidney, suggestive of   a subcapsular hemorrhage. There is also a retroperitoneal hemorrhage in   the left posterior pararenal space with mild extension along the left   psoas muscle. The largest component of this left retroperitoneal   hemorrhage measures approximately 5.9 x 1.5 cm in maximum cross-section.    Mild hyperdensity in the left renal calyces and renal pelvis may   represent hemorrhage or diluted contrast from recent nephrostomy   placement.    Small amount of air is noted in the left renal pelvis andleft   perinephric space, likely due to recent nephrostomy placement.    There is vicarious excretion of IV contrast in the gallbladder.    Allowing for the noncontrast technique, the liver, pancreas, spleen, and   adrenals appear grossly unremarkable.    The appendix appears normal. No evidence for bowel obstruction or grossly   thickened bowel wall. No evidence for free air or enlarged lymph node    Pelvis: Streaky artifact from right hip replacement limits evaluation.   There is a new Montano catheter in the urinary bladder which is collapsed.   The uterus appears grossly unremarkable. The mild pelvic free fluid,   increased since the previous examination. The bowel structures are   grossly intact. No enlarged pelvic lymph node.    BilateralL5 spondylolysis.    Impression: New small left pleural effusion.    No evidence for a calculus in the ureters or right kidney. Previously,   there was a staghorn calculus in the left kidney. There is interval   fragmentation of the staghorn calculuswith small residual calculi in the   left kidney measuring up to 9 mm.    Interval placement of a percutaneous left nephrostomy tube with its   retainer balloon in the left renal pelvis and its distal tip in the left   distal ureter. There is also interval placement of a double-J left   ureteral stent.     New crescentic hyperdensity at the periphery of the left kidney,   suggestive of a subcapsular hemorrhage. There is also a retroperitoneal   hemorrhage in the left posterior pararenal space with mild extension   along the left psoas muscle. The largest component of this left   retroperitoneal hemorrhage measures approximately 5.9 x 1.5 cm in maximum   cross-section.    Mild hyperdensity in the left renal calyces and renal pelvis may   represent hemorrhage or diluted contrast from recent nephrostomy   placement.    Mild pelvic free fluid, increased since the previous examination.

## 2017-08-03 NOTE — DISCHARGE NOTE ADULT - PATIENT PORTAL LINK FT
“You can access the FollowHealth Patient Portal, offered by [facility] [place], by registering with the following website: [email or web address]”

## 2017-08-03 NOTE — PROGRESS NOTE ADULT - ASSESSMENT
26year old female with pmhx of hypertension, dislocated right hip, kidney stone, osteoarthritis and UTI presents today  for left percutaneous nephrolithotomy on 7/31/17 ,post op HTN and now anemia due to hematoma.  1.Cont b blocker.  2.Echocardiogram.  3.Transfuse 2 units PRBC.  4.GI and DVT prophylaxis.

## 2017-08-03 NOTE — DISCHARGE NOTE ADULT - MEDICATION SUMMARY - MEDICATIONS TO TAKE
I will START or STAY ON the medications listed below when I get home from the hospital:    acetaminophen-oxycodone 325 mg-5 mg oral tablet  -- 1 tab(s) by mouth every 6 hours, As Needed, Moderate Pain MDD:4  -- Indication: For Moderate pain    Mobic 15 mg oral tablet  -- 1 tab(s) by mouth once a day, As Needed  -- Indication: For Mild pain    Tylenol 500 mg oral tablet  -- 2 tab(s) by mouth every 6 hours, As Needed  -- Indication: For Headache    atenolol 25 mg oral tablet  -- 1 tab(s) by mouth once a day  -- Indication: For HTN (hypertension)    Cipro 500 mg oral tablet  -- 1 tab(s) by mouth every 12 hours  -- Avoid prolonged or excessive exposure to direct and/or artificial sunlight while taking this medication.  Check with your doctor before becoming pregnant.  Do not take dairy products, antacids, or iron preparations within one hour of this medication.  Finish all this medication unless otherwise directed by prescriber.  Medication should be taken with plenty of water.    -- Indication: For Prophylaxis

## 2017-08-03 NOTE — DISCHARGE NOTE ADULT - CARE PROVIDER_API CALL
Azar Casillas), Urology  [address] [place] [address]  Phone: [phone]  Fax: [phone]

## 2017-08-03 NOTE — DISCHARGE NOTE ADULT - PLAN OF CARE
Extraction of kidney stones Keep nephrostomy tube to leg bag. Empty when full, recording amount each time. Complete antibiotics course. Await for Dr. Casillas to schedule PCNL continue Atenolol

## 2017-08-03 NOTE — PROGRESS NOTE ADULT - SUBJECTIVE AND OBJECTIVE BOX
INTERVAL HPI/OVERNIGHT EVENTS:  Pt resting comfortably. Tolerating back pain. Montano, Left neph tube in place.     MEDICATIONS  (STANDING):  sodium chloride 0.9% lock flush 3 milliLiter(s) IV Push every 8 hours  ciprofloxacin   IVPB 400 milliGRAM(s) IV Intermittent every 12 hours  dextrose 5% + sodium chloride 0.45%. 1000 milliLiter(s) (100 mL/Hr) IV Continuous <Continuous>  pantoprazole  Injectable 40 milliGRAM(s) IV Push daily  ATENolol  Tablet 25 milliGRAM(s) Oral daily    MEDICATIONS  (PRN):  acetaminophen   Tablet 650 milliGRAM(s) Oral every 6 hours PRN For Temp greater than 38.5 C (101.3 F)  oxyCODONE    5 mG/acetaminophen 325 mG 1 Tablet(s) Oral every 4 hours PRN Moderate Pain  ondansetron Injectable 4 milliGRAM(s) IV Push every 6 hours PRN Nausea      Vital Signs Last 24 Hrs  T(C): 36.8 (03 Aug 2017 05:46), Max: 36.8 (02 Aug 2017 13:28)  T(F): 98.2 (03 Aug 2017 05:46), Max: 98.2 (02 Aug 2017 13:28)  HR: 79 (03 Aug 2017 05:46) (77 - 83)  BP: 127/79 (03 Aug 2017 05:46) (127/79 - 134/84)  BP(mean): --  RR: 18 (03 Aug 2017 05:46) (16 - 18)  SpO2: 100% (03 Aug 2017 05:46) (100% - 100%)    Physical:  General: A&Ox3. NAD.  Abdomen: Soft nondistended, no suprapubic tenderness.  Back: Dressing dry, intact    I&O's Detail    02 Aug 2017 07:01  -  03 Aug 2017 07:00  --------------------------------------------------------  IN:  Total IN: 0 mL    OUT:    Drain: 600 mL caprice, mild blood tinged  Total OUT: 600 mL    Total NET: -600 mL      LABS:                        7.7    6.0   )-----------( 169      ( 03 Aug 2017 06:19 )             23.6             08-03    141  |  109<H>  |  7   ----------------------------<  102<H>  4.1   |  27  |  0.87    Ca    7.6<L>      03 Aug 2017 06:19  Phos  1.6     08-02  Mg     2.0     08-02

## 2017-08-03 NOTE — PROGRESS NOTE ADULT - PROBLEM SELECTOR PROBLEM 1
Nephrostomy status
Nephrostomy status
Calculus of kidney
Nephrostomy status
Kidney stones
Retroperitoneal hemorrhage
Acute post-operative pain
Acute post-operative pain

## 2017-08-03 NOTE — PROGRESS NOTE ADULT - SUBJECTIVE AND OBJECTIVE BOX
26 yea-old, white female with pmh of HTN  and kidney stones, s/p left nephrolithotomy and stent placement.  Awake, alert, comfortable in bed in NAD.Has hematuria via the nephrostomy cath.    REVIEW OF SYSTEMS:    CONSTITUTIONAL: No weakness, fevers or chills  EYES/ENT: No visual changes;  No vertigo or throat pain   NECK: No pain or stiffness  RESPIRATORY: No cough, wheezing, hemoptysis; No shortness of breath  CARDIOVASCULAR: No chest pain or palpitations  GASTROINTESTINAL: No abdominal or epigastric pain. No nausea, vomiting, or hematemesis; No diarrhea or constipation. No melena or hematochezia.  GENITOURINARY: No dysuria, frequency or hematuria  NEUROLOGICAL: No numbness or weakness  SKIN: No itching, burning, rashes, or lesions   All other review of systems is negative unless indicated above.    Physical Exam    General: WN/WD NAD  Neurology: A&Ox3, nonfocal, FU x 4  Respiratory: CTA B/L  CV: RRR, S1S2, no murmurs, rubs or gallops  Abdominal: Soft, NT, ND +BS, Last BM  Extremities: No edema, + peripheral pulses      Allergies  latex (Hives; Rash)  penicillin (Hives)      Health Issues  Calculus of kidney  Hypertension  UTI (urinary tract infection)  Dislocated hip  Osteoarthritis  Kidney stones  History of hip replacement, total, right  History of pelvic surgery      Vitals  T(F): 98.2 (08-03-17 @ 05:46), Max: 98.2 (08-02-17 @ 13:28)  HR: 79 (08-03-17 @ 05:46) (77 - 83)  BP: 127/79 (08-03-17 @ 05:46) (127/79 - 134/84)  RR: 18 (08-03-17 @ 05:46) (16 - 18)  SpO2: 100% (08-03-17 @ 05:46) (100% - 100%)  Wt(kg): --  CAPILLARY BLOOD GLUCOSE          Labs                          7.7    6.0   )-----------( 169      ( 03 Aug 2017 06:19 )             23.6       08-03    141  |  109<H>  |  7   ----------------------------<  102<H>  4.1   |  27  |  0.87    Ca    7.6<L>      03 Aug 2017 06:19  Phos  1.6     08-02  Mg     2.0     08-02              Radiology Results      Meds    MEDICATIONS  (STANDING):  sodium chloride 0.9% lock flush 3 milliLiter(s) IV Push every 8 hours  ciprofloxacin   IVPB 400 milliGRAM(s) IV Intermittent every 12 hours  pantoprazole  Injectable 40 milliGRAM(s) IV Push daily  ATENolol  Tablet 25 milliGRAM(s) Oral daily      MEDICATIONS  (PRN):  acetaminophen   Tablet 650 milliGRAM(s) Oral every 6 hours PRN For Temp greater than 38.5 C (101.3 F)  oxyCODONE    5 mG/acetaminophen 325 mG 1 Tablet(s) Oral every 4 hours PRN Moderate Pain  ondansetron Injectable 4 milliGRAM(s) IV Push every 6 hours PRN Nausea 26 yea-old, white female with pmh of HTN  and kidney stones, s/p left nephrolithotomy and stent placement.  Awake, alert, comfortable in bed in NAD.Has hematuria via the nephrostomy cath.    REVIEW OF SYSTEMS:No new complaints    CONSTITUTIONAL: No weakness, fevers or chills  EYES/ENT: No visual changes;  No vertigo or throat pain   NECK: No pain or stiffness  RESPIRATORY: No cough, wheezing, hemoptysis; No shortness of breath  CARDIOVASCULAR: No chest pain or palpitations  GASTROINTESTINAL: No abdominal or epigastric pain. No nausea, vomiting, or hematemesis; No diarrhea or constipation. No melena or hematochezia.  GENITOURINARY: No dysuria, frequency or hematuria  NEUROLOGICAL: No numbness or weakness  SKIN: No itching, burning, rashes, or lesions   All other review of systems is negative unless indicated above.    Physical Exam    General: WN/WD NAD  Neurology: A&Ox3, nonfocal, FU x 4  Respiratory: CTA B/L  CV: RRR, S1S2, no murmurs, rubs or gallops  Abdominal: Soft, NT, ND +BS, Last BM  Extremities: No edema, + peripheral pulses      Allergies  latex (Hives; Rash)  penicillin (Hives)      Health Issues  Calculus of kidney  Hypertension  UTI (urinary tract infection)  Dislocated hip  Osteoarthritis  Kidney stones  History of hip replacement, total, right  History of pelvic surgery      Vitals  T(F): 98.2 (08-03-17 @ 05:46), Max: 98.2 (08-02-17 @ 13:28)  HR: 79 (08-03-17 @ 05:46) (77 - 83)  BP: 127/79 (08-03-17 @ 05:46) (127/79 - 134/84)  RR: 18 (08-03-17 @ 05:46) (16 - 18)  SpO2: 100% (08-03-17 @ 05:46) (100% - 100%)  Wt(kg): --  CAPILLARY BLOOD GLUCOSE          Labs                          7.7    6.0   )-----------( 169      ( 03 Aug 2017 06:19 )             23.6       08-03    141  |  109<H>  |  7   ----------------------------<  102<H>  4.1   |  27  |  0.87    Ca    7.6<L>      03 Aug 2017 06:19  Phos  1.6     08-02  Mg     2.0     08-02              Radiology Results      Meds    MEDICATIONS  (STANDING):  sodium chloride 0.9% lock flush 3 milliLiter(s) IV Push every 8 hours  ciprofloxacin   IVPB 400 milliGRAM(s) IV Intermittent every 12 hours  pantoprazole  Injectable 40 milliGRAM(s) IV Push daily  ATENolol  Tablet 25 milliGRAM(s) Oral daily      MEDICATIONS  (PRN):  acetaminophen   Tablet 650 milliGRAM(s) Oral every 6 hours PRN For Temp greater than 38.5 C (101.3 F)  oxyCODONE    5 mG/acetaminophen 325 mG 1 Tablet(s) Oral every 4 hours PRN Moderate Pain  ondansetron Injectable 4 milliGRAM(s) IV Push every 6 hours PRN Nausea

## 2017-08-03 NOTE — PROGRESS NOTE ADULT - PROBLEM SELECTOR PROBLEM 3
UTI (urinary tract infection)
H/O nephrolithotomy with removal of calculi
H/O nephrolithotomy with removal of calculi

## 2017-08-03 NOTE — PROGRESS NOTE ADULT - PROBLEM SELECTOR PLAN 4
Monitor BP  Cont meds  Echo  Control BP

## 2017-08-07 ENCOUNTER — TRANSCRIPTION ENCOUNTER (OUTPATIENT)
Age: 27
End: 2017-08-07

## 2017-08-07 ENCOUNTER — RESULT REVIEW (OUTPATIENT)
Age: 27
End: 2017-08-07

## 2017-08-07 ENCOUNTER — INPATIENT (INPATIENT)
Facility: HOSPITAL | Age: 27
LOS: 0 days | Discharge: HOME | DRG: 670 | End: 2017-08-07
Attending: UROLOGY | Admitting: UROLOGY
Payer: MEDICARE

## 2017-08-07 VITALS
TEMPERATURE: 98 F | WEIGHT: 220.02 LBS | SYSTOLIC BLOOD PRESSURE: 146 MMHG | RESPIRATION RATE: 16 BRPM | HEART RATE: 85 BPM | OXYGEN SATURATION: 97 % | DIASTOLIC BLOOD PRESSURE: 86 MMHG

## 2017-08-07 VITALS
OXYGEN SATURATION: 100 % | RESPIRATION RATE: 16 BRPM | SYSTOLIC BLOOD PRESSURE: 136 MMHG | DIASTOLIC BLOOD PRESSURE: 79 MMHG | HEART RATE: 57 BPM | TEMPERATURE: 98 F

## 2017-08-07 DIAGNOSIS — N20.0 CALCULUS OF KIDNEY: ICD-10-CM

## 2017-08-07 DIAGNOSIS — Z98.890 OTHER SPECIFIED POSTPROCEDURAL STATES: Chronic | ICD-10-CM

## 2017-08-07 DIAGNOSIS — I10 ESSENTIAL (PRIMARY) HYPERTENSION: ICD-10-CM

## 2017-08-07 DIAGNOSIS — Z98.890 OTHER SPECIFIED POSTPROCEDURAL STATES: ICD-10-CM

## 2017-08-07 DIAGNOSIS — Z96.641 PRESENCE OF RIGHT ARTIFICIAL HIP JOINT: Chronic | ICD-10-CM

## 2017-08-07 LAB
ALBUMIN SERPL ELPH-MCNC: 3 G/DL — LOW (ref 3.5–5)
ALP SERPL-CCNC: 172 U/L — HIGH (ref 40–120)
ALT FLD-CCNC: 56 U/L DA — SIGNIFICANT CHANGE UP (ref 10–60)
ANION GAP SERPL CALC-SCNC: 8 MMOL/L — SIGNIFICANT CHANGE UP (ref 5–17)
APPEARANCE UR: ABNORMAL
AST SERPL-CCNC: 25 U/L — SIGNIFICANT CHANGE UP (ref 10–40)
BACTERIA # UR AUTO: ABNORMAL /HPF
BASOPHILS # BLD AUTO: 0.1 K/UL — SIGNIFICANT CHANGE UP (ref 0–0.2)
BASOPHILS NFR BLD AUTO: 1.3 % — SIGNIFICANT CHANGE UP (ref 0–2)
BILIRUB SERPL-MCNC: 0.5 MG/DL — SIGNIFICANT CHANGE UP (ref 0.2–1.2)
BILIRUB UR-MCNC: ABNORMAL
BUN SERPL-MCNC: 9 MG/DL — SIGNIFICANT CHANGE UP (ref 7–18)
CALCIUM SERPL-MCNC: 9 MG/DL — SIGNIFICANT CHANGE UP (ref 8.4–10.5)
CHLORIDE SERPL-SCNC: 104 MMOL/L — SIGNIFICANT CHANGE UP (ref 96–108)
CO2 SERPL-SCNC: 26 MMOL/L — SIGNIFICANT CHANGE UP (ref 22–31)
COLOR SPEC: YELLOW — SIGNIFICANT CHANGE UP
CREAT SERPL-MCNC: 0.91 MG/DL — SIGNIFICANT CHANGE UP (ref 0.5–1.3)
DIFF PNL FLD: ABNORMAL
EOSINOPHIL # BLD AUTO: 0.3 K/UL — SIGNIFICANT CHANGE UP (ref 0–0.5)
EOSINOPHIL NFR BLD AUTO: 2.6 % — SIGNIFICANT CHANGE UP (ref 0–6)
EPI CELLS # UR: ABNORMAL (ref 0–10)
GLUCOSE SERPL-MCNC: 107 MG/DL — HIGH (ref 70–99)
GLUCOSE UR QL: NEGATIVE — SIGNIFICANT CHANGE UP
HCG SERPL-ACNC: <1 MIU/ML — SIGNIFICANT CHANGE UP
HCT VFR BLD CALC: 31.5 % — LOW (ref 34.5–45)
HGB BLD-MCNC: 10.5 G/DL — LOW (ref 11.5–15.5)
KETONES UR-MCNC: NEGATIVE — SIGNIFICANT CHANGE UP
LEUKOCYTE ESTERASE UR-ACNC: ABNORMAL
LIDOCAIN IGE QN: 94 U/L — SIGNIFICANT CHANGE UP (ref 73–393)
LYMPHOCYTES # BLD AUTO: 1.8 K/UL — SIGNIFICANT CHANGE UP (ref 1–3.3)
LYMPHOCYTES # BLD AUTO: 18 % — SIGNIFICANT CHANGE UP (ref 13–44)
MCHC RBC-ENTMCNC: 32.9 PG — SIGNIFICANT CHANGE UP (ref 27–34)
MCHC RBC-ENTMCNC: 33.3 GM/DL — SIGNIFICANT CHANGE UP (ref 32–36)
MCV RBC AUTO: 98.8 FL — SIGNIFICANT CHANGE UP (ref 80–100)
MONOCYTES # BLD AUTO: 0.8 K/UL — SIGNIFICANT CHANGE UP (ref 0–0.9)
MONOCYTES NFR BLD AUTO: 8.2 % — SIGNIFICANT CHANGE UP (ref 2–14)
NEUTROPHILS # BLD AUTO: 6.9 K/UL — SIGNIFICANT CHANGE UP (ref 1.8–7.4)
NEUTROPHILS NFR BLD AUTO: 69.8 % — SIGNIFICANT CHANGE UP (ref 43–77)
NITRITE UR-MCNC: NEGATIVE — SIGNIFICANT CHANGE UP
PH UR: 8 — SIGNIFICANT CHANGE UP (ref 5–8)
PLATELET # BLD AUTO: 362 K/UL — SIGNIFICANT CHANGE UP (ref 150–400)
POTASSIUM SERPL-MCNC: 4.2 MMOL/L — SIGNIFICANT CHANGE UP (ref 3.5–5.3)
POTASSIUM SERPL-SCNC: 4.2 MMOL/L — SIGNIFICANT CHANGE UP (ref 3.5–5.3)
PROT SERPL-MCNC: 7.4 G/DL — SIGNIFICANT CHANGE UP (ref 6–8.3)
PROT UR-MCNC: 100
RBC # BLD: 3.18 M/UL — LOW (ref 3.8–5.2)
RBC # FLD: 13.4 % — SIGNIFICANT CHANGE UP (ref 10.3–14.5)
RBC CASTS # UR COMP ASSIST: SIGNIFICANT CHANGE UP /HPF (ref 0–2)
SODIUM SERPL-SCNC: 138 MMOL/L — SIGNIFICANT CHANGE UP (ref 135–145)
SP GR SPEC: 1.01 — SIGNIFICANT CHANGE UP (ref 1.01–1.02)
UROBILINOGEN FLD QL: 4
WBC # BLD: 9.8 K/UL — SIGNIFICANT CHANGE UP (ref 3.8–10.5)
WBC # FLD AUTO: 9.8 K/UL — SIGNIFICANT CHANGE UP (ref 3.8–10.5)
WBC UR QL: ABNORMAL /HPF (ref 0–5)

## 2017-08-07 PROCEDURE — 96375 TX/PRO/DX INJ NEW DRUG ADDON: CPT

## 2017-08-07 PROCEDURE — 85027 COMPLETE CBC AUTOMATED: CPT

## 2017-08-07 PROCEDURE — 88300 SURGICAL PATH GROSS: CPT

## 2017-08-07 PROCEDURE — 88300 SURGICAL PATH GROSS: CPT | Mod: 26

## 2017-08-07 PROCEDURE — 96374 THER/PROPH/DIAG INJ IV PUSH: CPT

## 2017-08-07 PROCEDURE — 99285 EMERGENCY DEPT VISIT HI MDM: CPT | Mod: 25

## 2017-08-07 PROCEDURE — 80053 COMPREHEN METABOLIC PANEL: CPT

## 2017-08-07 PROCEDURE — 81001 URINALYSIS AUTO W/SCOPE: CPT

## 2017-08-07 PROCEDURE — 82365 CALCULUS SPECTROSCOPY: CPT

## 2017-08-07 PROCEDURE — 76000 FLUOROSCOPY <1 HR PHYS/QHP: CPT

## 2017-08-07 PROCEDURE — 83690 ASSAY OF LIPASE: CPT

## 2017-08-07 PROCEDURE — 84702 CHORIONIC GONADOTROPIN TEST: CPT

## 2017-08-07 RX ORDER — ONDANSETRON 8 MG/1
4 TABLET, FILM COATED ORAL EVERY 6 HOURS
Qty: 0 | Refills: 0 | Status: DISCONTINUED | OUTPATIENT
Start: 2017-08-07 | End: 2017-08-07

## 2017-08-07 RX ORDER — SODIUM CHLORIDE 9 MG/ML
1000 INJECTION, SOLUTION INTRAVENOUS
Qty: 0 | Refills: 0 | Status: DISCONTINUED | OUTPATIENT
Start: 2017-08-07 | End: 2017-08-07

## 2017-08-07 RX ORDER — OXYCODONE AND ACETAMINOPHEN 5; 325 MG/1; MG/1
1 TABLET ORAL EVERY 4 HOURS
Qty: 0 | Refills: 0 | Status: DISCONTINUED | OUTPATIENT
Start: 2017-08-07 | End: 2017-08-07

## 2017-08-07 RX ORDER — ACETAMINOPHEN 500 MG
1000 TABLET ORAL ONCE
Qty: 0 | Refills: 0 | Status: COMPLETED | OUTPATIENT
Start: 2017-08-07 | End: 2017-08-07

## 2017-08-07 RX ORDER — ATENOLOL 25 MG/1
25 TABLET ORAL DAILY
Qty: 0 | Refills: 0 | Status: DISCONTINUED | OUTPATIENT
Start: 2017-08-07 | End: 2017-08-07

## 2017-08-07 RX ORDER — HYDROMORPHONE HYDROCHLORIDE 2 MG/ML
0.5 INJECTION INTRAMUSCULAR; INTRAVENOUS; SUBCUTANEOUS
Qty: 0 | Refills: 0 | Status: DISCONTINUED | OUTPATIENT
Start: 2017-08-07 | End: 2017-08-07

## 2017-08-07 RX ORDER — MORPHINE SULFATE 50 MG/1
4 CAPSULE, EXTENDED RELEASE ORAL EVERY 4 HOURS
Qty: 0 | Refills: 0 | Status: DISCONTINUED | OUTPATIENT
Start: 2017-08-07 | End: 2017-08-07

## 2017-08-07 RX ORDER — SODIUM CHLORIDE 9 MG/ML
1000 INJECTION INTRAMUSCULAR; INTRAVENOUS; SUBCUTANEOUS
Qty: 0 | Refills: 0 | Status: DISCONTINUED | OUTPATIENT
Start: 2017-08-07 | End: 2017-08-07

## 2017-08-07 RX ORDER — ONDANSETRON 8 MG/1
4 TABLET, FILM COATED ORAL ONCE
Qty: 0 | Refills: 0 | Status: COMPLETED | OUTPATIENT
Start: 2017-08-07 | End: 2017-08-07

## 2017-08-07 RX ADMIN — Medication 400 MILLIGRAM(S): at 07:56

## 2017-08-07 RX ADMIN — HYDROMORPHONE HYDROCHLORIDE 0.5 MILLIGRAM(S): 2 INJECTION INTRAMUSCULAR; INTRAVENOUS; SUBCUTANEOUS at 13:16

## 2017-08-07 RX ADMIN — Medication 1000 MILLIGRAM(S): at 08:30

## 2017-08-07 RX ADMIN — ONDANSETRON 4 MILLIGRAM(S): 8 TABLET, FILM COATED ORAL at 07:50

## 2017-08-07 RX ADMIN — HYDROMORPHONE HYDROCHLORIDE 0.5 MILLIGRAM(S): 2 INJECTION INTRAMUSCULAR; INTRAVENOUS; SUBCUTANEOUS at 13:14

## 2017-08-07 RX ADMIN — SODIUM CHLORIDE 125 MILLILITER(S): 9 INJECTION INTRAMUSCULAR; INTRAVENOUS; SUBCUTANEOUS at 07:50

## 2017-08-07 RX ADMIN — HYDROMORPHONE HYDROCHLORIDE 0.5 MILLIGRAM(S): 2 INJECTION INTRAMUSCULAR; INTRAVENOUS; SUBCUTANEOUS at 12:53

## 2017-08-07 NOTE — ED PROVIDER NOTE - MEDICAL DECISION MAKING DETAILS
Discussed with Dr. Casillas, will admit to his service. Discussed CATscan: states no CATscan needed at this time, will take pt to OR this afternoon.

## 2017-08-07 NOTE — ED ADULT TRIAGE NOTE - CHIEF COMPLAINT QUOTE
I had a surgery on my left kidney last Monday , I have a headache , nausea , back pain and lower abdominal pain since Saturday night I had a surgery Percutaneous nephrolithotomy (PCNL)  on my left kidney last Monday , I have a headache , nausea , back pain and lower abdominal pain since Saturday night

## 2017-08-07 NOTE — DISCHARGE NOTE ADULT - HOSPITAL COURSE
28 yo female s/p recent R PCNL, right nephro tube and stone extraction with difficult postop course, presents in the ED to c/o pain. Pt was brought back to the OR today for 2nd PCNL, and stone extraction for any remaining stones. Pt did well today, with the largest remaining stones removed, and minimal to no bleeding. Pt was discharged immediately postop after tolerating a diet, with a greenwood to leg bag. No nephrotubes in place

## 2017-08-07 NOTE — ED ADULT NURSE REASSESSMENT NOTE - NS ED NURSE REASSESS COMMENT FT1
Report received from DILLON Montelongo RN. Patient resting in bed. AA&Ox3. With left nephrostomy tube intact; draining yellow urine.

## 2017-08-07 NOTE — ED ADULT NURSE NOTE - CHIEF COMPLAINT QUOTE
I had a surgery on my left kidney last Monday , I have a headache , nausea , back pain and lower abdominal pain since Saturday night I had a surgery Percutaneous nephrolithotomy (PCNL)  on my left kidney last Monday , I have a headache , nausea , back pain and lower abdominal pain since Saturday night.

## 2017-08-07 NOTE — H&P ADULT - PROBLEM SELECTOR PLAN 1
1- admit to surgery   2- NPO   3- vitals q8   4- IV fluids   5- AM labs  6- consent obtained and in chart

## 2017-08-07 NOTE — ED PROVIDER NOTE - OBJECTIVE STATEMENT
26 y/o female with significant PMHx of kidney stones, and PSHx of hip replacement and percutaneous nephrolithotomy presents to the ED c/o diffuse abd pain. Pt describes abd pain as constant and aching in nature, with R worse than L. Pt had percutaneous nephrolithotomy complicated by needing blood transfusion, seen in ICU due to blood and heart rate control. Pt spoke to urologist to get admitted for further procedure. Pt also relates bodyaches and HA. Pt denies N/V/D, fever, chills or any other complaints.

## 2017-08-07 NOTE — ED ADULT NURSE NOTE - OBJECTIVE STATEMENT
Pt. is aox3 came to er via private car accompanied . Pt had a nephrostomy tube sx. last Monday the 31 now with incisional pain radiating to the right nand headache. 5cc in bag. pt denies fever. To be seen by attending.

## 2017-08-07 NOTE — DISCHARGE NOTE ADULT - CARE PLAN
Principal Discharge DX:	Kidney stones  Goal:	extraction of stones  Instructions for follow-up, activity and diet:	will go home with greenwood catheter to leg bag. f/u with Dr Casillas this week for greenwood removal. Take pain medications as perscribed and finish any antibiotics still leftover from previous course

## 2017-08-07 NOTE — DISCHARGE NOTE ADULT - CARE PROVIDER_API CALL
Azar Casillas), Urology  [address] [place] [address]  Phone: [phone]  Fax: [phone]

## 2017-08-07 NOTE — DISCHARGE NOTE ADULT - PATIENT PORTAL LINK FT
“You can access the FollowHealth Patient Portal, offered by [facility], by registering with the following website: [email or web address]”

## 2017-08-07 NOTE — ED PROVIDER NOTE - CARE PLAN
Principal Discharge DX:	Kidney stones  Secondary Diagnosis:	History of nephrolithotomy with removal of calculi

## 2017-08-07 NOTE — DISCHARGE NOTE ADULT - MEDICATION SUMMARY - MEDICATIONS TO TAKE
I will START or STAY ON the medications listed below when I get home from the hospital:    acetaminophen-oxycodone 325 mg-5 mg oral tablet  -- 1 tab(s) by mouth every 6 hours, As Needed, Moderate Pain MDD:4  -- Indication: For as previously perscribed, until finished    Mobic 15 mg oral tablet  -- 1 tab(s) by mouth once a day, As Needed  -- Indication: For as previously perscribed, until finished    Tylenol 500 mg oral tablet  -- 2 tab(s) by mouth every 6 hours, As Needed  -- Indication: For as previously perscribed, until finished    Percocet 5/325 325 mg-5 mg oral tablet  -- 1 tab(s) by mouth every 6 hours MDD:4 prn pain  -- Caution federal law prohibits the transfer of this drug to any person other  than the person for whom it was prescribed.  May cause drowsiness.  Alcohol may intensify this effect.  Use care when operating dangerous machinery.  This prescription cannot be refilled.  This product contains acetaminophen.  Do not use  with any other product containing acetaminophen to prevent possible liver damage.  Using more of this medication than prescribed may cause serious breathing problems.    -- Indication: For severe pain    atenolol 25 mg oral tablet  -- 1 tab(s) by mouth once a day  -- Indication: For as previously perscribed    Cipro 500 mg oral tablet  -- 1 tab(s) by mouth every 12 hours  -- Avoid prolonged or excessive exposure to direct and/or artificial sunlight while taking this medication.  Check with your doctor before becoming pregnant.  Do not take dairy products, antacids, or iron preparations within one hour of this medication.  Finish all this medication unless otherwise directed by prescriber.  Medication should be taken with plenty of water.    -- Indication: For as previously perscribed, until finished

## 2017-08-07 NOTE — DISCHARGE NOTE ADULT - PLAN OF CARE
extraction of stones will go home with greenwood catheter to leg bag. f/u with Dr Casillas this week for greenwood removal. Take pain medications as perscribed and finish any antibiotics still leftover from previous course

## 2017-08-07 NOTE — ED ADULT NURSE NOTE - PSH
History of hip replacement, total, right    History of nephrolithotomy with removal of calculi    History of pelvic surgery  Pelvic osteotomy - November 1999

## 2017-08-08 DIAGNOSIS — N99.820 POSTPROCEDURAL HEMORRHAGE OF A GENITOURINARY SYSTEM ORGAN OR STRUCTURE FOLLOWING A GENITOURINARY SYSTEM PROCEDURE: ICD-10-CM

## 2017-08-08 DIAGNOSIS — D64.89 OTHER SPECIFIED ANEMIAS: ICD-10-CM

## 2017-08-08 DIAGNOSIS — Z96.641 PRESENCE OF RIGHT ARTIFICIAL HIP JOINT: ICD-10-CM

## 2017-08-08 DIAGNOSIS — N20.0 CALCULUS OF KIDNEY: ICD-10-CM

## 2017-08-08 DIAGNOSIS — Z91.040 LATEX ALLERGY STATUS: ICD-10-CM

## 2017-08-08 DIAGNOSIS — N39.0 URINARY TRACT INFECTION, SITE NOT SPECIFIED: ICD-10-CM

## 2017-08-08 DIAGNOSIS — K66.1 HEMOPERITONEUM: ICD-10-CM

## 2017-08-08 DIAGNOSIS — Z88.0 ALLERGY STATUS TO PENICILLIN: ICD-10-CM

## 2017-08-08 DIAGNOSIS — E83.39 OTHER DISORDERS OF PHOSPHORUS METABOLISM: ICD-10-CM

## 2017-08-08 DIAGNOSIS — I97.89 OTHER POSTPROCEDURAL COMPLICATIONS AND DISORDERS OF THE CIRCULATORY SYSTEM, NOT ELSEWHERE CLASSIFIED: ICD-10-CM

## 2017-08-08 DIAGNOSIS — G89.18 OTHER ACUTE POSTPROCEDURAL PAIN: ICD-10-CM

## 2017-08-08 DIAGNOSIS — R00.0 TACHYCARDIA, UNSPECIFIED: ICD-10-CM

## 2017-08-08 DIAGNOSIS — R31.0 GROSS HEMATURIA: ICD-10-CM

## 2017-08-08 DIAGNOSIS — E66.9 OBESITY, UNSPECIFIED: ICD-10-CM

## 2017-08-08 DIAGNOSIS — I97.3 POSTPROCEDURAL HYPERTENSION: ICD-10-CM

## 2017-08-08 DIAGNOSIS — I10 ESSENTIAL (PRIMARY) HYPERTENSION: ICD-10-CM

## 2017-08-08 DIAGNOSIS — Z87.442 PERSONAL HISTORY OF URINARY CALCULI: ICD-10-CM

## 2017-08-09 DIAGNOSIS — N20.0 CALCULUS OF KIDNEY: ICD-10-CM

## 2017-08-09 DIAGNOSIS — Z96.641 PRESENCE OF RIGHT ARTIFICIAL HIP JOINT: ICD-10-CM

## 2017-08-09 DIAGNOSIS — M16.11 UNILATERAL PRIMARY OSTEOARTHRITIS, RIGHT HIP: ICD-10-CM

## 2017-08-09 DIAGNOSIS — Z88.0 ALLERGY STATUS TO PENICILLIN: ICD-10-CM

## 2017-08-09 DIAGNOSIS — Z87.442 PERSONAL HISTORY OF URINARY CALCULI: ICD-10-CM

## 2017-08-09 DIAGNOSIS — Z91.040 LATEX ALLERGY STATUS: ICD-10-CM

## 2017-10-31 ENCOUNTER — EMERGENCY (EMERGENCY)
Facility: HOSPITAL | Age: 27
LOS: 1 days | Discharge: HOME | End: 2017-10-31
Attending: EMERGENCY MEDICINE
Payer: MEDICAID

## 2017-10-31 VITALS
SYSTOLIC BLOOD PRESSURE: 134 MMHG | HEART RATE: 87 BPM | DIASTOLIC BLOOD PRESSURE: 79 MMHG | RESPIRATION RATE: 18 BRPM | TEMPERATURE: 98 F | OXYGEN SATURATION: 98 %

## 2017-10-31 VITALS
HEART RATE: 94 BPM | RESPIRATION RATE: 20 BRPM | SYSTOLIC BLOOD PRESSURE: 128 MMHG | DIASTOLIC BLOOD PRESSURE: 77 MMHG | OXYGEN SATURATION: 99 % | TEMPERATURE: 99 F

## 2017-10-31 DIAGNOSIS — Z98.890 OTHER SPECIFIED POSTPROCEDURAL STATES: Chronic | ICD-10-CM

## 2017-10-31 DIAGNOSIS — Z96.641 PRESENCE OF RIGHT ARTIFICIAL HIP JOINT: Chronic | ICD-10-CM

## 2017-10-31 LAB
ALBUMIN SERPL ELPH-MCNC: 3.9 G/DL — SIGNIFICANT CHANGE UP (ref 3.5–5)
ALP SERPL-CCNC: 94 U/L — SIGNIFICANT CHANGE UP (ref 40–120)
ALT FLD-CCNC: 22 U/L DA — SIGNIFICANT CHANGE UP (ref 10–60)
AMYLASE P1 CFR SERPL: 62 U/L — SIGNIFICANT CHANGE UP (ref 25–115)
ANION GAP SERPL CALC-SCNC: 6 MMOL/L — SIGNIFICANT CHANGE UP (ref 5–17)
APPEARANCE UR: CLEAR — SIGNIFICANT CHANGE UP
AST SERPL-CCNC: 16 U/L — SIGNIFICANT CHANGE UP (ref 10–40)
BACTERIA # UR AUTO: ABNORMAL /HPF
BASOPHILS # BLD AUTO: 0.1 K/UL — SIGNIFICANT CHANGE UP (ref 0–0.2)
BASOPHILS NFR BLD AUTO: 1.7 % — SIGNIFICANT CHANGE UP (ref 0–2)
BILIRUB SERPL-MCNC: 0.3 MG/DL — SIGNIFICANT CHANGE UP (ref 0.2–1.2)
BILIRUB UR-MCNC: NEGATIVE — SIGNIFICANT CHANGE UP
BUN SERPL-MCNC: 11 MG/DL — SIGNIFICANT CHANGE UP (ref 7–18)
CALCIUM SERPL-MCNC: 8.6 MG/DL — SIGNIFICANT CHANGE UP (ref 8.4–10.5)
CHLORIDE SERPL-SCNC: 107 MMOL/L — SIGNIFICANT CHANGE UP (ref 96–108)
CK SERPL-CCNC: 197 U/L — SIGNIFICANT CHANGE UP (ref 21–215)
CO2 SERPL-SCNC: 26 MMOL/L — SIGNIFICANT CHANGE UP (ref 22–31)
COLOR SPEC: YELLOW — SIGNIFICANT CHANGE UP
CREAT SERPL-MCNC: 0.9 MG/DL — SIGNIFICANT CHANGE UP (ref 0.5–1.3)
DIFF PNL FLD: ABNORMAL
EOSINOPHIL # BLD AUTO: 0.1 K/UL — SIGNIFICANT CHANGE UP (ref 0–0.5)
EOSINOPHIL NFR BLD AUTO: 1.7 % — SIGNIFICANT CHANGE UP (ref 0–6)
EPI CELLS # UR: ABNORMAL /HPF
GLUCOSE SERPL-MCNC: 95 MG/DL — SIGNIFICANT CHANGE UP (ref 70–99)
GLUCOSE UR QL: NEGATIVE — SIGNIFICANT CHANGE UP
HCG UR QL: NEGATIVE — SIGNIFICANT CHANGE UP
HCT VFR BLD CALC: 40 % — SIGNIFICANT CHANGE UP (ref 34.5–45)
HGB BLD-MCNC: 12.9 G/DL — SIGNIFICANT CHANGE UP (ref 11.5–15.5)
KETONES UR-MCNC: NEGATIVE — SIGNIFICANT CHANGE UP
LACTATE SERPL-SCNC: 1 MMOL/L — SIGNIFICANT CHANGE UP (ref 0.7–2)
LEUKOCYTE ESTERASE UR-ACNC: NEGATIVE — SIGNIFICANT CHANGE UP
LYMPHOCYTES # BLD AUTO: 2.2 K/UL — SIGNIFICANT CHANGE UP (ref 1–3.3)
LYMPHOCYTES # BLD AUTO: 30.5 % — SIGNIFICANT CHANGE UP (ref 13–44)
MCHC RBC-ENTMCNC: 30.4 PG — SIGNIFICANT CHANGE UP (ref 27–34)
MCHC RBC-ENTMCNC: 32.3 GM/DL — SIGNIFICANT CHANGE UP (ref 32–36)
MCV RBC AUTO: 94.4 FL — SIGNIFICANT CHANGE UP (ref 80–100)
MONOCYTES # BLD AUTO: 0.3 K/UL — SIGNIFICANT CHANGE UP (ref 0–0.9)
MONOCYTES NFR BLD AUTO: 4.3 % — SIGNIFICANT CHANGE UP (ref 2–14)
NEUTROPHILS # BLD AUTO: 4.5 K/UL — SIGNIFICANT CHANGE UP (ref 1.8–7.4)
NEUTROPHILS NFR BLD AUTO: 61.7 % — SIGNIFICANT CHANGE UP (ref 43–77)
NITRITE UR-MCNC: NEGATIVE — SIGNIFICANT CHANGE UP
PH UR: 5 — SIGNIFICANT CHANGE UP (ref 5–8)
PLATELET # BLD AUTO: 296 K/UL — SIGNIFICANT CHANGE UP (ref 150–400)
POTASSIUM SERPL-MCNC: 3.9 MMOL/L — SIGNIFICANT CHANGE UP (ref 3.5–5.3)
POTASSIUM SERPL-SCNC: 3.9 MMOL/L — SIGNIFICANT CHANGE UP (ref 3.5–5.3)
PROT SERPL-MCNC: 7.6 G/DL — SIGNIFICANT CHANGE UP (ref 6–8.3)
PROT UR-MCNC: NEGATIVE — SIGNIFICANT CHANGE UP
RBC # BLD: 4.24 M/UL — SIGNIFICANT CHANGE UP (ref 3.8–5.2)
RBC # FLD: 15.2 % — HIGH (ref 10.3–14.5)
RBC CASTS # UR COMP ASSIST: ABNORMAL /HPF (ref 0–2)
SODIUM SERPL-SCNC: 139 MMOL/L — SIGNIFICANT CHANGE UP (ref 135–145)
SP GR SPEC: 1.01 — SIGNIFICANT CHANGE UP (ref 1.01–1.02)
TROPONIN I SERPL-MCNC: <0.015 NG/ML — SIGNIFICANT CHANGE UP (ref 0–0.04)
TSH SERPL-MCNC: 1.23 UU/ML — SIGNIFICANT CHANGE UP (ref 0.34–4.82)
UROBILINOGEN FLD QL: NEGATIVE — SIGNIFICANT CHANGE UP
WBC # BLD: 7.3 K/UL — SIGNIFICANT CHANGE UP (ref 3.8–10.5)
WBC # FLD AUTO: 7.3 K/UL — SIGNIFICANT CHANGE UP (ref 3.8–10.5)
WBC UR QL: ABNORMAL /HPF (ref 0–5)

## 2017-10-31 PROCEDURE — 85027 COMPLETE CBC AUTOMATED: CPT

## 2017-10-31 PROCEDURE — 74176 CT ABD & PELVIS W/O CONTRAST: CPT | Mod: 26

## 2017-10-31 PROCEDURE — 87040 BLOOD CULTURE FOR BACTERIA: CPT

## 2017-10-31 PROCEDURE — 74176 CT ABD & PELVIS W/O CONTRAST: CPT

## 2017-10-31 PROCEDURE — 84443 ASSAY THYROID STIM HORMONE: CPT

## 2017-10-31 PROCEDURE — 80053 COMPREHEN METABOLIC PANEL: CPT

## 2017-10-31 PROCEDURE — 83605 ASSAY OF LACTIC ACID: CPT

## 2017-10-31 PROCEDURE — 84484 ASSAY OF TROPONIN QUANT: CPT

## 2017-10-31 PROCEDURE — 71020: CPT | Mod: 26

## 2017-10-31 PROCEDURE — 96374 THER/PROPH/DIAG INJ IV PUSH: CPT

## 2017-10-31 PROCEDURE — 93005 ELECTROCARDIOGRAM TRACING: CPT

## 2017-10-31 PROCEDURE — 81001 URINALYSIS AUTO W/SCOPE: CPT

## 2017-10-31 PROCEDURE — 81025 URINE PREGNANCY TEST: CPT

## 2017-10-31 PROCEDURE — 99284 EMERGENCY DEPT VISIT MOD MDM: CPT | Mod: 25

## 2017-10-31 PROCEDURE — 82150 ASSAY OF AMYLASE: CPT

## 2017-10-31 PROCEDURE — 82550 ASSAY OF CK (CPK): CPT

## 2017-10-31 PROCEDURE — 99285 EMERGENCY DEPT VISIT HI MDM: CPT

## 2017-10-31 PROCEDURE — 71046 X-RAY EXAM CHEST 2 VIEWS: CPT

## 2017-10-31 RX ORDER — MORPHINE SULFATE 50 MG/1
4 CAPSULE, EXTENDED RELEASE ORAL ONCE
Qty: 0 | Refills: 0 | Status: DISCONTINUED | OUTPATIENT
Start: 2017-10-31 | End: 2017-10-31

## 2017-10-31 RX ORDER — MOXIFLOXACIN HYDROCHLORIDE TABLETS, 400 MG 400 MG/1
1 TABLET, FILM COATED ORAL
Qty: 20 | Refills: 0 | OUTPATIENT
Start: 2017-10-31 | End: 2017-11-10

## 2017-10-31 RX ORDER — IBUPROFEN 200 MG
1 TABLET ORAL
Qty: 20 | Refills: 0 | OUTPATIENT
Start: 2017-10-31 | End: 2017-11-30

## 2017-10-31 RX ORDER — SODIUM CHLORIDE 9 MG/ML
3 INJECTION INTRAMUSCULAR; INTRAVENOUS; SUBCUTANEOUS ONCE
Qty: 0 | Refills: 0 | Status: COMPLETED | OUTPATIENT
Start: 2017-10-31 | End: 2017-10-31

## 2017-10-31 RX ORDER — CIPROFLOXACIN LACTATE 400MG/40ML
500 VIAL (ML) INTRAVENOUS ONCE
Qty: 0 | Refills: 0 | Status: COMPLETED | OUTPATIENT
Start: 2017-10-31 | End: 2017-10-31

## 2017-10-31 RX ORDER — IBUPROFEN 200 MG
600 TABLET ORAL ONCE
Qty: 0 | Refills: 0 | Status: DISCONTINUED | OUTPATIENT
Start: 2017-10-31 | End: 2017-11-11

## 2017-10-31 RX ADMIN — SODIUM CHLORIDE 3 MILLILITER(S): 9 INJECTION INTRAMUSCULAR; INTRAVENOUS; SUBCUTANEOUS at 12:57

## 2017-10-31 RX ADMIN — MORPHINE SULFATE 4 MILLIGRAM(S): 50 CAPSULE, EXTENDED RELEASE ORAL at 14:34

## 2017-10-31 RX ADMIN — MORPHINE SULFATE 4 MILLIGRAM(S): 50 CAPSULE, EXTENDED RELEASE ORAL at 14:33

## 2017-10-31 RX ADMIN — Medication 500 MILLIGRAM(S): at 17:38

## 2017-10-31 NOTE — ED PROVIDER NOTE - PROGRESS NOTE DETAILS
Pt pain improved, educated on results, care and f/u, s/s to return to ED. Urology referral given. Answered q's.

## 2017-10-31 NOTE — ED PROVIDER NOTE - OBJECTIVE STATEMENT
28 y/o F w/ PMHx of kidney stones, s/p PCNL in August presents to ED c/o 3 days of R sided abd pain, body aches and new onset chest pressure and chills today. Pt denies any cough, fever, leg swelling, pain, urinary symptoms or any other complaints.  Denies recent outside [place] travels, sick contacts.

## 2017-10-31 NOTE — ED PROVIDER NOTE - MEDICAL DECISION MAKING DETAILS
26 y/o F presenting w/ R sided abd pain and chest pain, w/ h/o kidney stones. Will check labs, UA, and re-assess. Concern for kidney infection, kidney stone, gallbladder disease, ACS.

## 2017-11-05 LAB
CULTURE RESULTS: SIGNIFICANT CHANGE UP
CULTURE RESULTS: SIGNIFICANT CHANGE UP
SPECIMEN SOURCE: SIGNIFICANT CHANGE UP
SPECIMEN SOURCE: SIGNIFICANT CHANGE UP

## 2017-11-07 DIAGNOSIS — N39.0 URINARY TRACT INFECTION, SITE NOT SPECIFIED: ICD-10-CM

## 2017-11-07 DIAGNOSIS — Z87.442 PERSONAL HISTORY OF URINARY CALCULI: ICD-10-CM

## 2017-11-07 DIAGNOSIS — N20.0 CALCULUS OF KIDNEY: ICD-10-CM

## 2017-11-07 DIAGNOSIS — Z88.0 ALLERGY STATUS TO PENICILLIN: ICD-10-CM

## 2017-11-07 DIAGNOSIS — M19.90 UNSPECIFIED OSTEOARTHRITIS, UNSPECIFIED SITE: ICD-10-CM

## 2017-11-07 DIAGNOSIS — Z96.641 PRESENCE OF RIGHT ARTIFICIAL HIP JOINT: ICD-10-CM

## 2017-11-07 DIAGNOSIS — Z91.040 LATEX ALLERGY STATUS: ICD-10-CM

## 2017-11-07 DIAGNOSIS — Z79.2 LONG TERM (CURRENT) USE OF ANTIBIOTICS: ICD-10-CM

## 2017-11-07 DIAGNOSIS — R07.9 CHEST PAIN, UNSPECIFIED: ICD-10-CM

## 2017-11-07 DIAGNOSIS — I10 ESSENTIAL (PRIMARY) HYPERTENSION: ICD-10-CM

## 2017-11-10 ENCOUNTER — APPOINTMENT (OUTPATIENT)
Dept: UROLOGY | Facility: CLINIC | Age: 27
End: 2017-11-10
Payer: MEDICAID

## 2017-11-10 VITALS
OXYGEN SATURATION: 99 % | HEART RATE: 100 BPM | HEIGHT: 69 IN | DIASTOLIC BLOOD PRESSURE: 90 MMHG | SYSTOLIC BLOOD PRESSURE: 142 MMHG | BODY MASS INDEX: 32.58 KG/M2 | WEIGHT: 220 LBS | TEMPERATURE: 97.6 F

## 2017-11-10 DIAGNOSIS — Z82.49 FAMILY HISTORY OF ISCHEMIC HEART DISEASE AND OTHER DISEASES OF THE CIRCULATORY SYSTEM: ICD-10-CM

## 2017-11-10 DIAGNOSIS — Z86.79 PERSONAL HISTORY OF OTHER DISEASES OF THE CIRCULATORY SYSTEM: ICD-10-CM

## 2017-11-10 DIAGNOSIS — Z83.3 FAMILY HISTORY OF DIABETES MELLITUS: ICD-10-CM

## 2017-11-10 DIAGNOSIS — Z72.0 TOBACCO USE: ICD-10-CM

## 2017-11-10 DIAGNOSIS — Z83.49 FAMILY HISTORY OF OTHER ENDOCRINE, NUTRITIONAL AND METABOLIC DISEASES: ICD-10-CM

## 2017-11-10 DIAGNOSIS — N20.0 CALCULUS OF KIDNEY: ICD-10-CM

## 2017-11-10 DIAGNOSIS — F15.90 OTHER STIMULANT USE, UNSPECIFIED, UNCOMPLICATED: ICD-10-CM

## 2017-11-10 PROCEDURE — 99213 OFFICE O/P EST LOW 20 MIN: CPT

## 2017-11-10 RX ORDER — CEPHALEXIN 500 MG/1
500 CAPSULE ORAL
Qty: 14 | Refills: 0 | Status: ACTIVE | COMMUNITY
Start: 2017-07-12

## 2017-11-10 RX ORDER — OXYCODONE AND ACETAMINOPHEN 5; 325 MG/1; MG/1
5-325 TABLET ORAL
Qty: 5 | Refills: 0 | Status: ACTIVE | COMMUNITY
Start: 2017-10-31

## 2017-11-10 RX ORDER — CIPROFLOXACIN HYDROCHLORIDE 500 MG/1
500 TABLET, FILM COATED ORAL
Qty: 20 | Refills: 0 | Status: ACTIVE | COMMUNITY
Start: 2017-10-31

## 2017-11-10 RX ORDER — ONDANSETRON 4 MG/1
4 TABLET, ORALLY DISINTEGRATING ORAL
Qty: 12 | Refills: 0 | Status: ACTIVE | COMMUNITY
Start: 2017-05-10

## 2017-11-10 RX ORDER — AMLODIPINE BESYLATE 5 MG/1
5 TABLET ORAL
Qty: 30 | Refills: 0 | Status: ACTIVE | COMMUNITY
Start: 2017-10-13

## 2017-11-10 RX ORDER — ATENOLOL 25 MG/1
25 TABLET ORAL
Qty: 30 | Refills: 0 | Status: ACTIVE | COMMUNITY
Start: 2017-10-27

## 2017-11-10 RX ORDER — SULFAMETHOXAZOLE AND TRIMETHOPRIM 800; 160 MG/1; MG/1
800-160 TABLET ORAL
Qty: 14 | Refills: 0 | Status: ACTIVE | COMMUNITY
Start: 2017-05-10

## 2017-11-10 RX ORDER — IBUPROFEN 600 MG/1
600 TABLET, FILM COATED ORAL
Qty: 20 | Refills: 0 | Status: ACTIVE | COMMUNITY
Start: 2017-10-31

## 2017-11-10 RX ORDER — NAPROXEN 500 MG/1
500 TABLET ORAL
Qty: 14 | Refills: 0 | Status: ACTIVE | COMMUNITY
Start: 2017-07-12

## 2017-12-18 PROCEDURE — 88300 SURGICAL PATH GROSS: CPT

## 2017-12-18 PROCEDURE — 85027 COMPLETE CBC AUTOMATED: CPT

## 2017-12-18 PROCEDURE — 81025 URINE PREGNANCY TEST: CPT

## 2017-12-18 PROCEDURE — 74425 UROGRAPHY ANTEGRADE RS&I: CPT

## 2017-12-18 PROCEDURE — 76000 FLUOROSCOPY <1 HR PHYS/QHP: CPT

## 2017-12-18 PROCEDURE — 86900 BLOOD TYPING SEROLOGIC ABO: CPT

## 2017-12-18 PROCEDURE — 74176 CT ABD & PELVIS W/O CONTRAST: CPT

## 2017-12-18 PROCEDURE — 84100 ASSAY OF PHOSPHORUS: CPT

## 2017-12-18 PROCEDURE — 36415 COLL VENOUS BLD VENIPUNCTURE: CPT

## 2017-12-18 PROCEDURE — 36430 TRANSFUSION BLD/BLD COMPNT: CPT

## 2017-12-18 PROCEDURE — C1758: CPT

## 2017-12-18 PROCEDURE — 85610 PROTHROMBIN TIME: CPT

## 2017-12-18 PROCEDURE — 83735 ASSAY OF MAGNESIUM: CPT

## 2017-12-18 PROCEDURE — 80048 BASIC METABOLIC PNL TOTAL CA: CPT

## 2017-12-18 PROCEDURE — C1889: CPT

## 2017-12-18 PROCEDURE — C1769: CPT

## 2017-12-18 PROCEDURE — 82365 CALCULUS SPECTROSCOPY: CPT

## 2017-12-18 PROCEDURE — 77001 FLUOROGUIDE FOR VEIN DEVICE: CPT

## 2017-12-18 PROCEDURE — 76937 US GUIDE VASCULAR ACCESS: CPT

## 2017-12-18 PROCEDURE — 86920 COMPATIBILITY TEST SPIN: CPT

## 2017-12-18 PROCEDURE — 86901 BLOOD TYPING SEROLOGIC RH(D): CPT

## 2017-12-18 PROCEDURE — P9040: CPT

## 2017-12-18 PROCEDURE — C1887: CPT

## 2017-12-18 PROCEDURE — 50430 NJX PX NFROSGRM &/URTRGRM: CPT

## 2017-12-18 PROCEDURE — 86850 RBC ANTIBODY SCREEN: CPT

## 2017-12-18 PROCEDURE — C2617: CPT

## 2022-08-11 NOTE — PHYSICAL THERAPY INITIAL EVALUATION ADULT - LEVEL OF INDEPENDENCE: SUPINE/SIT, REHAB EVAL
independent Mart-1 - Negative Histology Text: MART-1 staining demonstrates a normal density and pattern of melanocytes along the dermal-epidermal junction. The surgical margins are negative for tumor cells.

## 2023-04-01 NOTE — PROGRESS NOTE ADULT - NEGATIVE CARDIOVASCULAR SYMPTOMS
Rupa Padilla was admitted to Room 306 from ED via cart.   Reason for hospitalization is N/V; abdominal pain.   Upon arrival, patient is stable. Patient has history significant for   Past Medical History:   Diagnosis Date   • Anemia in chronic illness    • Asthma    • CAD (coronary artery disease)     S/P Angioplasty and Stents X 7   • Cervical cancer (CMD)    • Chronic diastolic CHF (congestive heart failure) (CMD)    • Chronic indwelling Wagner catheter    • Chronic pain disorder    • CKD (chronic kidney disease) stage 3, GFR 30-59 ml/min (CMD)    • Constitutional chronic hypocalcemia    • Depression with anxiety    • Diabetes mellitus, type 2 (CMD)    • Diabetic retinopathy (CMD)    • Wagner catheter in place 2022    per spouse nivia   • GERD (gastroesophageal reflux disease)    • Glaucoma    • Grade II diastolic dysfunction    • Hiatal hernia     Per EGD   • HLD (hyperlipidemia)    • HTN (hypertension)    • Lung abscess (CMD)    • Primary generalized (osteo)arthritis    • PUD (peptic ulcer disease)     Bleeding duodenal and gastric ulcers   • Status post below-knee amputation of left lower extremity (CMD)    • Vitamin D deficiency      .  Patient oriented to bed, call light, , room and unit.  Patient provided with the following educational materials upon admission:safety, advanced directives, infection control and pain.   Level of understanding patient verbalized understanding.   Admission orders received at this time.   Dr. Bowen notified of patient arrival.   See Epic documentation for patient individualized nursing care plan.   no chest pain/no palpitations

## 2023-04-18 NOTE — ED PROVIDER NOTE - CROS ED ROS STATEMENT
Chief Complaint   Patient presents with    Follow-up        HPI: Patient is a 80 y.o. female  with c/o nocturia, waking 4 times per night. She has done PT and tried Oxybutynin XL 5 mg and Myrbetriq 50 mg without significant changes in symptoms. Reports minimal daytime voiding and incontinence.  She does not endorse stress urinary incontinence with coughing, sneezing or laughing although there is urodynamic evidence of it.     Patient states that she is waking 5 times per night to void and sometimes leaking urine before she reaches the bathroom. Patient reports that she is not wearing support stocking or keeping her legs elevated in the evening. She is taking her lasix at about 4 pm.    REVIEW OF SYSTEMS:  A full 14-point ROS was performed and was significant for those  mentioned in the HPI.     The following portions of the patient's history were reviewed and updated as appropriate: allergies, current medications, past medical history, past surgical history and problem list.    PHYSICAL EXAMINATION    Vitals:    23 1050   BP: (!) 160/82        General: Healthy in appearance, Well nourished, Affect Normal, NAD.         ASSESSMENT & PLAN:  Nocturia  -     trospium (SANCTURA XR) 60 mg Cp24 capsule; Take 1 capsule (60 mg total) by mouth once daily.  Dispense: 30 capsule; Refill: 11    Urinary incontinence, urge       79 yo with nocturia and urgency urinary incontinence at night presented for a follow-up visit. Patient has not found Oxybutynin or Myrbetriq to help with symptoms. Advised that the patient wear support stockings during the day. Her daughter was with her for the visit who states she will take her to the uniform store to get measured. She will try to take the Lasix a little earlier in the day and keep her legs elevated in the evening. Additionally we will try Sanctura and if this does not work then consider Gemtesa.     All questions were answered today. The patient was encouraged to contact the  office as needed with any additional questions or concerns.     Total time spent on visit was 20 minutes.  This includes face to face time and non-face to face time preparing to see the patient (eg, review of tests), Obtaining and/or reviewing separately obtained history, Documenting clinical information in the electronic or other health record, Independently interpreting resultsand communicating results to the patient/family/caregiver, or Care coordination.    Isabelle Wilkins MD     all other ROS negative except as per HPI

## 2023-08-07 NOTE — ED ADULT NURSE NOTE - NS ED NURSE RECORD ANOTHER HT AND WT
-Medication(s) as prescribed and/or directed in AVS  -Ensure adequate hydration  -Urine culture was obtained today; if any further treatment is needed you will be contacted    Follow-up with primary care provider in the next 2-3 days for re-evaluation. Seek immediate medical attention at the nearest Emergency Room as discussed and/or indicated/outlined below in AVS.    Please review additional instructions as documented in the AVS below.    Thank you for visiting [facility].   
Yes

## 2024-06-13 NOTE — ED PROVIDER NOTE - ENMT, MLM
Problem: Pain  Goal: Verbalizes/displays adequate comfort level or baseline comfort level  6/13/2024 1140 by Caitlin Turner RN  Outcome: Adequate for Discharge  6/13/2024 1028 by Samantha Villareal RN  Outcome: Progressing  6/13/2024 0720 by aKvita Renee RN  Outcome: Progressing     Problem: Safety - Adult  Goal: Free from fall injury  6/13/2024 1140 by Caitlin Turner RN  Outcome: Adequate for Discharge  6/13/2024 1028 by Samantha Villareal RN  Outcome: Progressing  6/13/2024 0720 by Kavita Renee RN  Outcome: Progressing     Problem: Discharge Planning  Goal: Discharge to home or other facility with appropriate resources  6/13/2024 1140 by Caitlin Turner RN  Outcome: Adequate for Discharge  6/13/2024 0720 by Kavita Renee RN  Outcome: Progressing     Problem: ABCDS Injury Assessment  Goal: Absence of physical injury  6/13/2024 1140 by Caitlin Turner RN  Outcome: Adequate for Discharge  6/13/2024 1028 by Samantha Villareal RN  Outcome: Progressing  6/13/2024 0720 by Kavita Renee RN  Outcome: Progressing      Airway patent, Nasal mucosa clear. Mouth with normal mucosa. Throat has no vesicles, no oropharyngeal exudates and uvula is midline.

## 2024-12-13 NOTE — PROGRESS NOTE ADULT - PROBLEM/PLAN-1
DISPLAY PLAN FREE TEXT
For information on Fall & Injury Prevention, visit: [email or web address] OR  [email or web address] OR  https://www.cdc.gov/steadi/patient.html